# Patient Record
Sex: MALE | Race: WHITE | NOT HISPANIC OR LATINO | Employment: FULL TIME | URBAN - METROPOLITAN AREA
[De-identification: names, ages, dates, MRNs, and addresses within clinical notes are randomized per-mention and may not be internally consistent; named-entity substitution may affect disease eponyms.]

---

## 2017-03-29 ENCOUNTER — HOSPITAL ENCOUNTER (EMERGENCY)
Facility: HOSPITAL | Age: 67
Discharge: HOME | End: 2017-03-29
Attending: EMERGENCY MEDICINE
Payer: COMMERCIAL

## 2017-03-29 VITALS
HEART RATE: 82 BPM | BODY MASS INDEX: 28.15 KG/M2 | WEIGHT: 219.38 LBS | SYSTOLIC BLOOD PRESSURE: 134 MMHG | OXYGEN SATURATION: 95 % | RESPIRATION RATE: 23 BRPM | DIASTOLIC BLOOD PRESSURE: 78 MMHG | HEIGHT: 74 IN | TEMPERATURE: 98 F

## 2017-03-29 DIAGNOSIS — R53.83 FATIGUE, UNSPECIFIED TYPE: Primary | ICD-10-CM

## 2017-03-29 DIAGNOSIS — R53.83 LETHARGY: ICD-10-CM

## 2017-03-29 LAB
ALBUMIN SERPL BCP-MCNC: 3.9 G/DL
ALP SERPL-CCNC: 61 U/L
ALT SERPL W/O P-5'-P-CCNC: 19 U/L
ANION GAP SERPL CALC-SCNC: 12 MMOL/L
AST SERPL-CCNC: 18 U/L
BASOPHILS # BLD AUTO: 0.04 K/UL
BASOPHILS NFR BLD: 0.4 %
BILIRUB SERPL-MCNC: 0.5 MG/DL
BILIRUB UR QL STRIP: NEGATIVE
BUN SERPL-MCNC: 19 MG/DL
CALCIUM SERPL-MCNC: 9 MG/DL
CHLORIDE SERPL-SCNC: 107 MMOL/L
CLARITY UR REFRACT.AUTO: CLEAR
CO2 SERPL-SCNC: 20 MMOL/L
COLOR UR AUTO: YELLOW
CREAT SERPL-MCNC: 1.1 MG/DL
DIFFERENTIAL METHOD: NORMAL
EOSINOPHIL # BLD AUTO: 0.3 K/UL
EOSINOPHIL NFR BLD: 3.3 %
ERYTHROCYTE [DISTWIDTH] IN BLOOD BY AUTOMATED COUNT: 14.3 %
EST. GFR  (AFRICAN AMERICAN): >60 ML/MIN/1.73 M^2
EST. GFR  (NON AFRICAN AMERICAN): >60 ML/MIN/1.73 M^2
ETHANOL SERPL-MCNC: <10 MG/DL
GLUCOSE SERPL-MCNC: 113 MG/DL
GLUCOSE UR QL STRIP: NEGATIVE
HCT VFR BLD AUTO: 44.1 %
HGB BLD-MCNC: 14.7 G/DL
HGB UR QL STRIP: NEGATIVE
INR PPP: 1
KETONES UR QL STRIP: NEGATIVE
LEUKOCYTE ESTERASE UR QL STRIP: NEGATIVE
LYMPHOCYTES # BLD AUTO: 2.6 K/UL
LYMPHOCYTES NFR BLD: 27.1 %
MAGNESIUM SERPL-MCNC: 2 MG/DL
MCH RBC QN AUTO: 29.4 PG
MCHC RBC AUTO-ENTMCNC: 33.3 %
MCV RBC AUTO: 88 FL
MONOCYTES # BLD AUTO: 1 K/UL
MONOCYTES NFR BLD: 10.8 %
NEUTROPHILS # BLD AUTO: 5.6 K/UL
NEUTROPHILS NFR BLD: 58.2 %
NITRITE UR QL STRIP: NEGATIVE
PH UR STRIP: 6 [PH] (ref 5–8)
PHOSPHATE SERPL-MCNC: 3.5 MG/DL
PLATELET # BLD AUTO: 332 K/UL
PMV BLD AUTO: 9.4 FL
POTASSIUM SERPL-SCNC: 4.3 MMOL/L
PROT SERPL-MCNC: 7.4 G/DL
PROT UR QL STRIP: NEGATIVE
PROTHROMBIN TIME: 10.4 SEC
RBC # BLD AUTO: 5 M/UL
SODIUM SERPL-SCNC: 139 MMOL/L
SP GR UR STRIP: 1.02 (ref 1–1.03)
T4 FREE SERPL-MCNC: 1.18 NG/DL
TROPONIN I SERPL DL<=0.01 NG/ML-MCNC: <0.006 NG/ML
TSH SERPL DL<=0.005 MIU/L-ACNC: 0.18 UIU/ML
URN SPEC COLLECT METH UR: NORMAL
UROBILINOGEN UR STRIP-ACNC: NEGATIVE EU/DL
WBC # BLD AUTO: 9.6 K/UL

## 2017-03-29 PROCEDURE — 84439 ASSAY OF FREE THYROXINE: CPT

## 2017-03-29 PROCEDURE — 84484 ASSAY OF TROPONIN QUANT: CPT

## 2017-03-29 PROCEDURE — 83735 ASSAY OF MAGNESIUM: CPT

## 2017-03-29 PROCEDURE — 80320 DRUG SCREEN QUANTALCOHOLS: CPT

## 2017-03-29 PROCEDURE — 99284 EMERGENCY DEPT VISIT MOD MDM: CPT | Mod: 25

## 2017-03-29 PROCEDURE — 85025 COMPLETE CBC W/AUTO DIFF WBC: CPT

## 2017-03-29 PROCEDURE — 85610 PROTHROMBIN TIME: CPT

## 2017-03-29 PROCEDURE — 99900035 HC TECH TIME PER 15 MIN (STAT)

## 2017-03-29 PROCEDURE — 80053 COMPREHEN METABOLIC PANEL: CPT

## 2017-03-29 PROCEDURE — 81003 URINALYSIS AUTO W/O SCOPE: CPT

## 2017-03-29 PROCEDURE — 93010 ELECTROCARDIOGRAM REPORT: CPT | Mod: ,,, | Performed by: INTERNAL MEDICINE

## 2017-03-29 PROCEDURE — 93005 ELECTROCARDIOGRAM TRACING: CPT

## 2017-03-29 PROCEDURE — 84443 ASSAY THYROID STIM HORMONE: CPT

## 2017-03-29 PROCEDURE — 84100 ASSAY OF PHOSPHORUS: CPT

## 2017-03-29 RX ORDER — TAMSULOSIN HYDROCHLORIDE 0.4 MG/1
0.4 CAPSULE ORAL DAILY
COMMUNITY

## 2017-03-29 RX ORDER — LISINOPRIL 10 MG/1
10 TABLET ORAL DAILY
COMMUNITY

## 2017-03-29 RX ORDER — SIMVASTATIN 40 MG/1
40 TABLET, FILM COATED ORAL NIGHTLY
COMMUNITY

## 2017-03-29 RX ORDER — OMEPRAZOLE 40 MG/1
40 CAPSULE, DELAYED RELEASE ORAL DAILY
COMMUNITY

## 2017-03-29 RX ORDER — ALLOPURINOL 300 MG/1
300 TABLET ORAL DAILY
COMMUNITY

## 2017-03-29 RX ORDER — LEVOTHYROXINE SODIUM 100 UG/1
100 TABLET ORAL DAILY
COMMUNITY

## 2017-03-29 NOTE — ED AVS SNAPSHOT
[facility]  [address] [address] [address]               Rico Richards   3/29/2017  8:04 PM   ED    Description:  Male : 1950   Department:  [facility]           Your Care was Coordinated By:     Provider Role From To    Sal Boo MD Attending Provider 17 --      Reason for Visit     Fatigue           Diagnoses this Visit        Comments    Fatigue, unspecified type    -  Primary     Lethargy           ED Disposition     None           To Do List           Follow-up Information     Follow up with Sampson Velazquez MD In 3 days.    Specialty:  Internal Medicine    Contact information:    [address]  THE [facility] [address] [phone]          Follow up with [facility].    Specialty:  Emergency Medicine    Why:  If symptoms worsen, Or worsening condition or any other major concern    Contact information:    [address] [address] [address] [phone]      [facility]     [facility] Care [facility] -  Assistance  Registered nurses in the [facility] provide clinical advisement, health education, appointment booking, and other advisory services.  Call for this free service at [phone].             Medications                Verify that the below list of medications is an accurate representation of the medications you are currently taking.  If none reported, the list may be blank. If incorrect, please contact your healthcare provider. Carry this list with you in case of emergency.           Current Medications     allopurinol (ZYLOPRIM) 300 MG tablet Take 300 mg by mouth once daily.    levothyroxine (SYNTHROID) 100 MCG tablet Take 100 mcg by mouth once daily.    lisinopril 10 MG tablet Take 10 mg by mouth once daily.    omeprazole (PRILOSEC) 40 MG capsule Take 40 mg by mouth once daily.    simvastatin (ZOCOR) 40 MG tablet Take 40 mg by mouth  "every evening.    tamsulosin (FLOMAX) 0.4 mg Cp24 Take 0.4 mg by mouth once daily.           Clinical Reference Information           Your Vitals Were     BP Pulse Temp Resp Height Weight    118/73 82 97.9 °F (36.6 °C) (Oral) 18 6' 2" (1.88 m) 99.5 kg (219 lb 6.4 oz)    SpO2 BMI             96% 28.17 kg/m2         Allergies as of 3/29/2017        Reactions    Pcn [Penicillins]       Immunizations Administered on Date of Encounter - 3/29/2017     None      ED Micro, Lab, POCT     Start Ordered       Status Ordering Provider    03/29/17 2039 03/29/17 2038  Phosphorus  STAT      Final result     03/29/17 2039 03/29/17 2038  TSH  STAT      Final result     03/29/17 2039 03/29/17 2038  T4, free  STAT      Final result     03/29/17 2038 03/29/17 2038  CBC auto differential  STAT      Final result     03/29/17 2038 03/29/17 2038  Comprehensive metabolic panel  STAT      Final result     03/29/17 2038 03/29/17 2038  Urinalysis - Clean Catch  STAT      Final result     03/29/17 2038 03/29/17 2038  Ethanol  Once      Final result     03/29/17 2038 03/29/17 2038  Protime-INR  STAT      Final result     03/29/17 2038 03/29/17 2038  Troponin I  STAT      Final result     03/29/17 2038 03/29/17 2038  Magnesium  Once      Final result       ED Imaging Orders     Start Ordered       Status Ordering Provider    03/29/17 2038 03/29/17 2038  X-Ray Chest PA And Lateral  1 time imaging      Final result       Discharge References/Attachments     FATIGUE, MANAGING (ENGLISH)    WEAKNESS (UNCERTAIN CAUSE) (ENGLISH)      [facility] Sign-Up     Activating your [facility] account is as easy as 1-2-3!     1) Visit [email or web address], select Sign Up Now, enter this activation code and your date of birth, then select Next.  [email or web address]  Expires: 5/13/2017 10:29 PM      2) Create a username and password to use when you visit [facility] in the future and select a security question in case you lose your password and select Next.    3) Enter your " e-mail address and click Sign Up!    Additional Information  If you have questions, please e-mail [email or web address] or call [phone] to talk to our [facility] staff. Remember, [facility] is NOT to be used for urgent needs. For medical emergencies, dial 911.         Smoking Cessation     If you would like to quit smoking:   You may be eligible for free services if you are a [place] resident and started smoking cigarettes before September 1, 1988.  Call the Smoking Cessation Trust ([facility]) toll free at [phone] or [phone].   Call 4-268-QUIT-NOW if you do not meet the above criteria.             [facility] complies with applicable Federal civil rights laws and does not discriminate on the basis of race, color, national origin, age, disability, or sex.        Language Assistance Services     ATTENTION: Language assistance services are available, free of charge. Please call [phone].      ATENCIÓN: Si habla español, tiene a vergara disposición servicios gratuitos de asistencia lingüística. Llame al [phone].     CHÚ Ý: N?u b?n nói Ti?ng Vi?t, có các d?ch v? h? tr? ngôn ng? mi?n phí dành cho b?n. G?i s? [phone].

## 2017-03-30 NOTE — ED NOTES
"Patient verbally verified and Spelled Full Name and Date of Birth.  Pt c/o sudden onset weakness,"im feeling lethargic"  since about 6pm tonight .  Pt denies pain anywhere, just weakness.  LOC: The patient is awake, alert and aware of environment with an appropriate affect, the patient is oriented x 3 and speaking appropriately.  APPEARANCE: Patient resting comfortably and in no acute distress, patient is clean and well groomed, patient's clothing is properly fastened.  HEENT: Brief WNL, face symmetrical, no drooping, speech clear  SKIN: Brief WNL, flushed.   MUSCULOSKELETAL: Brief WNL, MAEW, pt denies focal weakness, equal strengths x 4 ext  RESPIRATORY: Brief WNL, resp easy, chest sounds clear laura  CARDIAC: Brief WNL, denies chest pain  GASTRO: Brief WNL, abd soft, nontender,  Appetite as usual, denies N or V  : Brief WNL, denies dysuria  Peripheral Vasc: Brief WNL  NEURO: Brief WNL, alert  PSYCH: Brief WNL  "

## 2017-03-30 NOTE — ED NOTES
Pt states that in past few weeks if he is standing up for any length of time (over 5 min) he begins to feel as if he will  Fall.  He denies getting light headed or dizzy, denies pain anywhere, just feels like he's going fall down, yet denies weakness in legs.  ERMD made aware

## 2017-03-30 NOTE — ED PROVIDER NOTES
"SCRIBE #1 NOTE: I, Sal Boo, am scribing for, and in the presence of, No att. providers found. I have scribed the entire note.      History      Chief Complaint   Patient presents with    Fatigue     Pt states "I'm just not feeling right. Maybe my BP, maybe my thyroid. I'm not really sure but I know it's something. I'm feeling real lethargic." Denies CP, denies SOB, denies N/V/D. Onset 2h PTA.        Review of patient's allergies indicates:   Allergen Reactions    Pcn [penicillins]         HPI   HPI    3/29/2017, 9:25 PM   History obtained from the patient      History of Present Illness: Rico Richards is a 66 y.o. male patient who presents to the Emergency Department for evaluation for lethargy, fatigue, and weakness.  The patient noted that he had gotten up for work late this afternoon and went to work around 6:00 this evening.  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  The patient denied any focal neurologic deficits or slurred speech or weakness on one side more than the other.  The patient also denies any visual changes, shortness of breath, chest pain, abdominal pain, nausea, vomiting, fever, chills, or diarrhea.  The patient notes that he has had some issues with his thyroid in the past and that he takes Synthroid.  The patient denies taking any sedatives, alcohol, or drugs.  The patient noted that he did take some melatonin approximately 2 days ago.  There are no other major concerns or complaints noted at this time.      Arrival mode: Personal vehicle    PCP: Sampson Velazquez MD       Past Medical History:  Past Medical History:   Diagnosis Date    BPH (benign prostatic hyperplasia)     GERD (gastroesophageal reflux disease)     Hypertension     Kidney stones     Thyroid disease        Past Surgical History:  Past Surgical History:   Procedure Laterality Date    BACK SURGERY      THYROIDECTOMY, PARTIAL           Family " History:  History reviewed. No pertinent family history.    Social History:  Social History     Social History Main Topics    Smoking status: Current Every Day Smoker     Packs/day: 1.00     Types: Cigarettes    Smokeless tobacco: Never Used    Alcohol use Yes      Comment: rarely    Drug use: No    Sexual activity: Not on file       ROS   Review of Systems   Constitutional: Positive for fatigue. Negative for fever.   HENT: Negative for sore throat.    Respiratory: Negative for shortness of breath.    Cardiovascular: Negative for chest pain.   Gastrointestinal: Negative for nausea.   Genitourinary: Negative for dysuria.   Musculoskeletal: Negative for back pain.   Skin: Negative for rash.   Neurological: Negative for weakness.   Hematological: Does not bruise/bleed easily.   All other systems reviewed and are negative.      Physical Exam    Initial Vitals   BP Pulse Resp Temp SpO2   03/29/17 2016 03/29/17 2016 03/29/17 2016 03/29/17 2016 03/29/17 2016   121/79 105 20 97.9 °F (36.6 °C) 95 %      Physical Exam  Nursing Notes and Vital Signs Reviewed.  Constitutional: Patient is in no acute distress. Awake and alert. Well-developed and well-nourished.  Head: Atraumatic. Normocephalic.  Eyes: PERRL. EOM intact. Conjunctivae are not pale. No scleral icterus.  ENT: Mucous membranes are moist. Oropharynx is clear and symmetric.    Neck: Supple. Full ROM. No lymphadenopathy.  Cardiovascular: Regular rate. Regular rhythm. No murmurs, rubs, or gallops. Distal pulses are 2+ and symmetric.  Pulmonary/Chest: No respiratory distress. Clear to auscultation bilaterally. No wheezing, rales, or rhonchi.  Abdominal: Soft and non-distended.  There is no tenderness.  No rebound, guarding, or rigidity. Good bowel sounds.  Genitourinary: No CVA tenderness  Musculoskeletal: Moves all extremities. No obvious deformities. No edema. No calf tenderness.  Skin: Warm and dry.  Neurological:  Alert, awake, and appropriate.  Normal speech.   "No acute focal neurological deficits are appreciated.  Psychiatric: Normal affect. Good eye contact. Appropriate in content.    ED Course    Procedures  ED Vital Signs:  Vitals:    03/29/17 2016 03/29/17 2058 03/29/17 2100 03/29/17 2102   BP: 121/79 123/72 129/77 117/72   Pulse: 105 87 99 100   Resp: 20 16 (!) 25 20   Temp: 97.9 °F (36.6 °C)      TempSrc: Oral      SpO2: 95% (!) 94% 96% 96%   Weight: 99.5 kg (219 lb 6.4 oz)      Height: 6' 2" (1.88 m)       03/29/17 2131 03/29/17 2201 03/29/17 2231   BP: 121/75 118/73 134/78   Pulse: 86 82 82   Resp: 17 18 (!) 23   Temp:      TempSrc:      SpO2: (!) 94% 96% 95%   Weight:      Height:          Abnormal Lab Results:  Labs Reviewed   COMPREHENSIVE METABOLIC PANEL - Abnormal; Notable for the following:        Result Value    CO2 20 (*)     Glucose 113 (*)     All other components within normal limits   TSH - Abnormal; Notable for the following:     TSH 0.179 (*)     All other components within normal limits   CBC W/ AUTO DIFFERENTIAL   URINALYSIS   ALCOHOL,MEDICAL (ETHANOL)   PROTIME-INR   TROPONIN I   MAGNESIUM   PHOSPHORUS   T4, FREE        All Lab Results:  Results for orders placed or performed during the hospital encounter of 03/29/17   CBC auto differential   Result Value Ref Range    WBC 9.60 3.90 - 12.70 K/uL    RBC 5.00 4.60 - 6.20 M/uL    Hemoglobin 14.7 14.0 - 18.0 g/dL    Hematocrit 44.1 40.0 - 54.0 %    MCV 88 82 - 98 fL    MCH 29.4 27.0 - 31.0 pg    MCHC 33.3 32.0 - 36.0 %    RDW 14.3 11.5 - 14.5 %    Platelets 332 150 - 350 K/uL    MPV 9.4 9.2 - 12.9 fL    Gran # 5.6 1.8 - 7.7 K/uL    Lymph # 2.6 1.0 - 4.8 K/uL    Mono # 1.0 0.3 - 1.0 K/uL    Eos # 0.3 0.0 - 0.5 K/uL    Baso # 0.04 0.00 - 0.20 K/uL    Gran% 58.2 38.0 - 73.0 %    Lymph% 27.1 18.0 - 48.0 %    Mono% 10.8 4.0 - 15.0 %    Eosinophil% 3.3 0.0 - 8.0 %    Basophil% 0.4 0.0 - 1.9 %    Differential Method Automated    Comprehensive metabolic panel   Result Value Ref Range    Sodium 139 136 - 145 " mmol/L    Potassium 4.3 3.5 - 5.1 mmol/L    Chloride 107 95 - 110 mmol/L    CO2 20 (L) 23 - 29 mmol/L    Glucose 113 (H) 70 - 110 mg/dL    BUN, Bld 19 8 - 23 mg/dL    Creatinine 1.1 0.5 - 1.4 mg/dL    Calcium 9.0 8.7 - 10.5 mg/dL    Total Protein 7.4 6.0 - 8.4 g/dL    Albumin 3.9 3.5 - 5.2 g/dL    Total Bilirubin 0.5 0.1 - 1.0 mg/dL    Alkaline Phosphatase 61 55 - 135 U/L    AST 18 10 - 40 U/L    ALT 19 10 - 44 U/L    Anion Gap 12 8 - 16 mmol/L    eGFR if African American >60.0 >60 mL/min/1.73 m^2    eGFR if non African American >60.0 >60 mL/min/1.73 m^2   Urinalysis - Clean Catch   Result Value Ref Range    Specimen UA Urine, Clean Catch     Color, UA Yellow Yellow, Straw, Elin    Appearance, UA Clear Clear    pH, UA 6.0 5.0 - 8.0    Specific Gravity, UA 1.020 1.005 - 1.030    Protein, UA Negative Negative    Glucose, UA Negative Negative    Ketones, UA Negative Negative    Bilirubin (UA) Negative Negative    Occult Blood UA Negative Negative    Nitrite, UA Negative Negative    Urobilinogen, UA Negative <2.0 EU/dL    Leukocytes, UA Negative Negative   Ethanol   Result Value Ref Range    Alcohol, Medical, Serum <10 <10 mg/dL   Protime-INR   Result Value Ref Range    Prothrombin Time 10.4 9.0 - 12.5 sec    INR 1.0 0.8 - 1.2   Troponin I   Result Value Ref Range    Troponin I <0.006 0.000 - 0.026 ng/mL   Magnesium   Result Value Ref Range    Magnesium 2.0 1.6 - 2.6 mg/dL   Phosphorus   Result Value Ref Range    Phosphorus 3.5 2.7 - 4.5 mg/dL   TSH   Result Value Ref Range    TSH 0.179 (L) 0.400 - 4.000 uIU/mL   T4, free   Result Value Ref Range    Free T4 1.18 0.71 - 1.51 ng/dL         Imaging Results:  Imaging Results         X-Ray Chest PA And Lateral (Final result) Result time:  03/29/17 21:40:06    Final result by Timothy Agustin III, MD (03/29/17 21:40:06)    Impression:     Negative two-view chest x-ray.      Electronically signed by: TIMOTHY AGUSTIN MD  Date:     03/29/17  Time:    21:40     Narrative:     Chest x-ray, 2 views.    Indication chest pain.    Normal heart size appeared clear lungs.                EKG Readings: (Independently Interpreted)   Initial Reading: No STEMI. Rhythm: Normal Sinus Rhythm. Heart Rate: 93. Ectopy: No Ectopy. Conduction: Normal. Axis: Normal.        The Emergency Provider reviewed the vital signs and test results, which are outlined above.    ED Discussion     9:34 PM - Re-evaluation:  The patient is resting comfortably and is in no acute distress. He states that his symptoms have improved after treatment within ER. Discussed test results and notified of pending labs. Answered questions at this time.     10:22 PM - Re-evaluation: The patient is resting comfortably and is in no acute distress. He states that his symptoms have improved after treatment within ER. Discussed test results, shared treatment plan, specific conditions for return, and importance of follow up with patient and family.  He understands and agrees with the plan as discussed. Answered  his questions at this time. He has remained hemodynamically stable throughout the ED course and is appropriate for discharge home.         ED Medication(s):  Medications - No data to display    Discharge Medication List as of 3/29/2017 10:29 PM          Follow-up Information     Follow up with Sampson Velazquez MD In 3 days.    Specialty:  Internal Medicine    Contact information:    [address]  THE [facility] [address] [phone]          Follow up with [facility].    Specialty:  Emergency Medicine    Why:  If symptoms worsen, Or worsening condition or any other major concern    Contact information:    [address] [address] [address] [phone]            Medical Decision Making    Medical Decision Making:   Clinical Tests:   Lab Tests: Ordered and Reviewed  The following lab test(s) were unremarkable: CBC, CMP, PT, Troponin and Urinalysis       <> Summary of Lab:  Magnesium, phosphorus, TSH, free T4 levels were all drawn as well  Radiological Study: Ordered and Reviewed  Medical Tests: Ordered and Reviewed           Scribe Attestation:   Scribe #1: I performed the above scribed service and the documentation accurately describes the services I performed. I attest to the accuracy of the note.    Attending:   Physician Attestation Statement for Scribe #1: I, No att. providers found, personally performed the services described in this documentation, as scribed by Sal Boo, in my presence, and it is both accurate and complete.          Clinical Impression       ICD-10-CM ICD-9-CM   1. Fatigue, unspecified type R53.83 780.79   2. Lethargy R53.83 780.79       Disposition:   Disposition: Discharged  Condition: Stable         Sal Boo MD  03/30/17 0452

## 2018-05-01 ENCOUNTER — OFFICE VISIT (OUTPATIENT)
Dept: INTERNAL MEDICINE | Facility: CLINIC | Age: 68
End: 2018-05-01
Payer: COMMERCIAL

## 2018-05-01 VITALS
HEIGHT: 73 IN | RESPIRATION RATE: 16 BRPM | OXYGEN SATURATION: 97 % | HEART RATE: 99 BPM | WEIGHT: 222 LBS | DIASTOLIC BLOOD PRESSURE: 90 MMHG | BODY MASS INDEX: 29.42 KG/M2 | TEMPERATURE: 97 F | SYSTOLIC BLOOD PRESSURE: 120 MMHG

## 2018-05-01 DIAGNOSIS — L23.7 ALLERGIC DERMATITIS DUE TO POISON IVY: Primary | ICD-10-CM

## 2018-05-01 PROCEDURE — 99213 OFFICE O/P EST LOW 20 MIN: CPT | Mod: 25,,, | Performed by: NURSE PRACTITIONER

## 2018-05-01 PROCEDURE — 96372 THER/PROPH/DIAG INJ SC/IM: CPT | Mod: S$GLB,,, | Performed by: FAMILY MEDICINE

## 2018-05-01 PROCEDURE — 99999 PR PBB SHADOW E&M-EST. PATIENT-LVL IV: CPT | Mod: PBBFAC,,, | Performed by: NURSE PRACTITIONER

## 2018-05-01 RX ORDER — METHYLPREDNISOLONE ACETATE 40 MG/ML
40 INJECTION, SUSPENSION INTRA-ARTICULAR; INTRALESIONAL; INTRAMUSCULAR; SOFT TISSUE
Status: COMPLETED | OUTPATIENT
Start: 2018-05-01 | End: 2018-05-01

## 2018-05-01 RX ORDER — VARDENAFIL HYDROCHLORIDE 20 MG/1
20 TABLET ORAL
COMMUNITY
Start: 2017-08-21 | End: 2018-08-21

## 2018-05-01 RX ADMIN — METHYLPREDNISOLONE ACETATE 40 MG: 40 INJECTION, SUSPENSION INTRA-ARTICULAR; INTRALESIONAL; INTRAMUSCULAR; SOFT TISSUE at 07:05

## 2018-05-01 NOTE — PATIENT INSTRUCTIONS
Managing a Poison Ivy, Poison Oak, or Poison Sumac Reaction  If you come in contact with urushiol    If you think you may have come in contact with the sap oil (called urushiol) contained in poison ivy, poison oak, or poison sumac plants, wash the affected part of your skin. Do this within 15 minutes after contact. Use water or preferably, soap and water.  Undress, and wash your clothes and gear as soon as you can. Be sure to wash any pet that was with you. Taking these steps can help prevent spreading sap oil to someone else. If you have a rash, but are not sure if it is from one of these plants, see your healthcare provider.  To soothe the itching  Your skin may react to poison oak, poison ivy, and poison sumac within hours to a few days after contact. Once you have come into contact with these plants, you cant stop the reaction. But you can take these steps to soothe the itching:  · Dont scratch or scrub your rash. Not even if the itching is severe. Scratching can lead to infection.  · Bathe in lukewarm (not hot) water. Or take short cool showers to relieve the itching. For a more soothing bath, add oatmeal to the water.  · Use antihistamines that are taken by mouth. These include diphenhydramine. You can buy these at the pharmacy. Talk to your healthcare provider or pharmacist for more information on oral antihistamines.  · Use over-the-counter treatments on your skin. These include cortisone and calamine lotion.  How your skin may react  A mild rash may become red, swollen, and itchy. The rash may form a line on your skin where you brushed against the plant. If you have a severe rash, your itching may worsen. And your rash may blister and ooze. If this happens, seek medical care. The fluid from your blisters will not make your rash spread. With or without medical care, your rash may last up to 3 weeks. In the future, try to avoid coming in contact with these plants.  When to call your healthcare  provider  Call your healthcare provider if:  · Your rash is severe  · The rash spreads beyond the exposed part of your body or affects your face.  · The rash does not clear up within a few weeks  You may be given medicine to take by mouth or apply directly on the skin.  Call 911  Call 911 if you have any of the following:  · Trouble breathing or swallowing  · Any significant swelling  Date Last Reviewed: 3/1/2017  © 6304-2396 [employer]. [address] [place] [address], [place] [address]. All rights reserved. This information is not intended as a substitute for professional medical care. Always follow your healthcare professional's instructions.

## 2018-05-01 NOTE — PROGRESS NOTES
Subjective:       Patient ID: Rico Richards is a 67 y.o. male.    Chief Complaint: Poison Ivy (arms) and Itching (arms)    Poison Ivy   This is a new problem. The current episode started in the past 7 days. The problem has been gradually worsening since onset. The affected locations include the right wrist and left wrist. The rash is characterized by blistering, redness and itchiness. He was exposed to plant contact. Pertinent negatives include no congestion, cough, diarrhea, fatigue, fever, joint pain, rhinorrhea, shortness of breath or sore throat. Past treatments include anti-itch cream, topical steroids and antihistamine.     Review of Systems   Constitutional: Negative for appetite change, chills, diaphoresis, fatigue and fever.   HENT: Negative for congestion, rhinorrhea and sore throat.    Eyes: Negative.    Respiratory: Negative for cough, chest tightness and shortness of breath.    Cardiovascular: Negative.    Gastrointestinal: Negative for abdominal pain, diarrhea and nausea.   Musculoskeletal: Negative.  Negative for joint pain.   Skin: Positive for color change, itching and rash. Negative for wound.   Allergic/Immunologic: Positive for environmental allergies.   Neurological: Negative.        Objective:      Physical Exam   Constitutional: He is oriented to person, place, and time. He appears well-developed. No distress.   Eyes: Pupils are equal, round, and reactive to light.   Cardiovascular: Normal rate and regular rhythm.    Pulmonary/Chest: Effort normal and breath sounds normal. No respiratory distress. He has no wheezes.   Neurological: He is alert and oriented to person, place, and time.   Skin: Skin is warm and dry. Rash noted. Rash is maculopapular. He is not diaphoretic.        Psychiatric: He has a normal mood and affect. His behavior is normal.   Nursing note and vitals reviewed.      Assessment:       1. Allergic dermatitis due to poison ivy        Plan:     Problem List Items Addressed This  Visit        Other    Allergic dermatitis due to poison ivy - Primary    Current Assessment & Plan     Can also use cold compress or oatmeal bath etc for supportive care  Avoid scratching  RTC if not improving over next 3 days         Relevant Medications    methylPREDNISolone acetate injection 40 mg (Completed)

## 2018-05-02 PROBLEM — L23.7 ALLERGIC DERMATITIS DUE TO POISON IVY: Status: ACTIVE | Noted: 2018-05-02

## 2018-05-02 NOTE — ASSESSMENT & PLAN NOTE
Can also use cold compress or oatmeal bath etc for supportive care  Avoid scratching  RTC if not improving over next 3 days

## 2021-02-18 ENCOUNTER — HOSPITAL ENCOUNTER (EMERGENCY)
Facility: HOSPITAL | Age: 71
Discharge: HOME | End: 2021-02-18
Attending: FAMILY MEDICINE
Payer: MEDICARE

## 2021-02-18 VITALS
WEIGHT: 238.13 LBS | BODY MASS INDEX: 30.56 KG/M2 | TEMPERATURE: 98 F | SYSTOLIC BLOOD PRESSURE: 153 MMHG | HEART RATE: 107 BPM | RESPIRATION RATE: 16 BRPM | DIASTOLIC BLOOD PRESSURE: 88 MMHG | OXYGEN SATURATION: 96 % | HEIGHT: 74 IN

## 2021-02-18 DIAGNOSIS — T83.9XXA PROBLEM WITH FOLEY CATHETER, INITIAL ENCOUNTER: Primary | ICD-10-CM

## 2021-02-18 DIAGNOSIS — N32.89 BLADDER SPASM: ICD-10-CM

## 2021-02-18 PROCEDURE — 25000003 PHARM REV CODE 250: Mod: ER | Performed by: FAMILY MEDICINE

## 2021-02-18 PROCEDURE — 99283 EMERGENCY DEPT VISIT LOW MDM: CPT | Mod: ER

## 2021-02-18 RX ORDER — OXYBUTYNIN CHLORIDE 5 MG/1
5 TABLET ORAL 2 TIMES DAILY
Qty: 6 TABLET | Refills: 0 | Status: SHIPPED | OUTPATIENT
Start: 2021-02-18 | End: 2021-02-21

## 2021-02-18 RX ORDER — LIDOCAINE HYDROCHLORIDE 20 MG/ML
JELLY TOPICAL
Status: DISCONTINUED | OUTPATIENT
Start: 2021-02-18 | End: 2021-02-18

## 2021-02-18 RX ORDER — LIDOCAINE HYDROCHLORIDE 20 MG/ML
15 SOLUTION OROPHARYNGEAL
Status: DISCONTINUED | OUTPATIENT
Start: 2021-02-18 | End: 2021-02-18

## 2021-02-18 RX ORDER — LIDOCAINE HYDROCHLORIDE 20 MG/ML
10 JELLY TOPICAL
Status: COMPLETED | OUTPATIENT
Start: 2021-02-18 | End: 2021-02-18

## 2021-02-18 RX ADMIN — LIDOCAINE HYDROCHLORIDE 10 ML: 20 JELLY TOPICAL at 09:02

## 2022-10-28 ENCOUNTER — HOSPITAL ENCOUNTER (EMERGENCY)
Facility: HOSPITAL | Age: 72
Discharge: HOME | End: 2022-10-28
Attending: EMERGENCY MEDICINE
Payer: MEDICARE

## 2022-10-28 VITALS
WEIGHT: 198 LBS | TEMPERATURE: 98 F | SYSTOLIC BLOOD PRESSURE: 153 MMHG | HEIGHT: 74 IN | HEART RATE: 105 BPM | BODY MASS INDEX: 25.41 KG/M2 | RESPIRATION RATE: 14 BRPM | OXYGEN SATURATION: 100 % | DIASTOLIC BLOOD PRESSURE: 78 MMHG

## 2022-10-28 DIAGNOSIS — R53.83 FATIGUE: ICD-10-CM

## 2022-10-28 DIAGNOSIS — R53.81 DEBILITY: ICD-10-CM

## 2022-10-28 DIAGNOSIS — E86.0 DEHYDRATION: Primary | ICD-10-CM

## 2022-10-28 DIAGNOSIS — Z79.899 POLYPHARMACY: ICD-10-CM

## 2022-10-28 DIAGNOSIS — R06.02 SOB (SHORTNESS OF BREATH): ICD-10-CM

## 2022-10-28 LAB
ALBUMIN SERPL BCP-MCNC: 3.6 G/DL (ref 3.5–5.2)
ALP SERPL-CCNC: 154 U/L (ref 55–135)
ALT SERPL W/O P-5'-P-CCNC: 55 U/L (ref 10–44)
AMPHET+METHAMPHET UR QL: NEGATIVE
ANION GAP SERPL CALC-SCNC: 13 MMOL/L (ref 8–16)
AST SERPL-CCNC: 36 U/L (ref 10–40)
BACTERIA #/AREA URNS AUTO: NORMAL /HPF
BARBITURATES UR QL SCN>200 NG/ML: NEGATIVE
BASOPHILS # BLD AUTO: 0.08 K/UL (ref 0–0.2)
BASOPHILS NFR BLD: 1 % (ref 0–1.9)
BENZODIAZ UR QL SCN>200 NG/ML: NEGATIVE
BILIRUB SERPL-MCNC: 0.6 MG/DL (ref 0.1–1)
BILIRUB UR QL STRIP: NEGATIVE
BNP SERPL-MCNC: <10 PG/ML (ref 0–99)
BUN SERPL-MCNC: 15 MG/DL (ref 8–23)
BZE UR QL SCN: NEGATIVE
CALCIUM SERPL-MCNC: 9.5 MG/DL (ref 8.7–10.5)
CANNABINOIDS UR QL SCN: ABNORMAL
CAOX CRY UR QL COMP ASSIST: NORMAL
CHLORIDE SERPL-SCNC: 103 MMOL/L (ref 95–110)
CLARITY UR REFRACT.AUTO: CLEAR
CO2 SERPL-SCNC: 21 MMOL/L (ref 23–29)
COLOR UR AUTO: ABNORMAL
CREAT SERPL-MCNC: 0.9 MG/DL (ref 0.5–1.4)
CREAT UR-MCNC: 214.5 MG/DL (ref 23–375)
D DIMER PPP IA.FEU-MCNC: 1.91 MG/L FEU
DIFFERENTIAL METHOD: ABNORMAL
EOSINOPHIL # BLD AUTO: 0.1 K/UL (ref 0–0.5)
EOSINOPHIL NFR BLD: 1.6 % (ref 0–8)
ERYTHROCYTE [DISTWIDTH] IN BLOOD BY AUTOMATED COUNT: 13.3 % (ref 11.5–14.5)
EST. GFR  (NO RACE VARIABLE): >60 ML/MIN/1.73 M^2
GLUCOSE SERPL-MCNC: 106 MG/DL (ref 70–110)
GLUCOSE UR QL STRIP: NEGATIVE
HCT VFR BLD AUTO: 42.3 % (ref 40–54)
HGB BLD-MCNC: 14.3 G/DL (ref 14–18)
HGB UR QL STRIP: NEGATIVE
HYALINE CASTS UR QL AUTO: 1 /LPF
IMM GRANULOCYTES # BLD AUTO: 0.03 K/UL (ref 0–0.04)
IMM GRANULOCYTES NFR BLD AUTO: 0.4 % (ref 0–0.5)
KETONES UR QL STRIP: NEGATIVE
LACTATE SERPL-SCNC: 1.8 MMOL/L (ref 0.5–2.2)
LEUKOCYTE ESTERASE UR QL STRIP: ABNORMAL
LYMPHOCYTES # BLD AUTO: 1.6 K/UL (ref 1–4.8)
LYMPHOCYTES NFR BLD: 20.4 % (ref 18–48)
MAGNESIUM SERPL-MCNC: 1.7 MG/DL (ref 1.6–2.6)
MCH RBC QN AUTO: 29.5 PG (ref 27–31)
MCHC RBC AUTO-ENTMCNC: 33.8 G/DL (ref 32–36)
MCV RBC AUTO: 87 FL (ref 82–98)
METHADONE UR QL SCN>300 NG/ML: NEGATIVE
MICROSCOPIC COMMENT: NORMAL
MONOCYTES # BLD AUTO: 0.8 K/UL (ref 0.3–1)
MONOCYTES NFR BLD: 9.3 % (ref 4–15)
NEUTROPHILS # BLD AUTO: 5.4 K/UL (ref 1.8–7.7)
NEUTROPHILS NFR BLD: 67.3 % (ref 38–73)
NITRITE UR QL STRIP: NEGATIVE
NRBC BLD-RTO: 0 /100 WBC
OPIATES UR QL SCN: ABNORMAL
PCP UR QL SCN>25 NG/ML: NEGATIVE
PH UR STRIP: 7 [PH] (ref 5–8)
PLATELET # BLD AUTO: 504 K/UL (ref 150–450)
PMV BLD AUTO: 9.6 FL (ref 9.2–12.9)
POTASSIUM SERPL-SCNC: 4 MMOL/L (ref 3.5–5.1)
PROT SERPL-MCNC: 7.7 G/DL (ref 6–8.4)
PROT UR QL STRIP: ABNORMAL
RBC # BLD AUTO: 4.84 M/UL (ref 4.6–6.2)
RBC #/AREA URNS AUTO: 0 /HPF (ref 0–4)
SODIUM SERPL-SCNC: 137 MMOL/L (ref 136–145)
SP GR UR STRIP: 1.01 (ref 1–1.03)
T4 FREE SERPL-MCNC: 1.32 NG/DL (ref 0.71–1.51)
TOXICOLOGY INFORMATION: ABNORMAL
TROPONIN I SERPL DL<=0.01 NG/ML-MCNC: 0.01 NG/ML (ref 0–0.03)
TSH SERPL DL<=0.005 MIU/L-ACNC: 0.27 UIU/ML (ref 0.4–4)
URN SPEC COLLECT METH UR: ABNORMAL
UROBILINOGEN UR STRIP-ACNC: NEGATIVE EU/DL
WBC # BLD AUTO: 8.03 K/UL (ref 3.9–12.7)
WBC #/AREA URNS AUTO: 1 /HPF (ref 0–5)

## 2022-10-28 PROCEDURE — 99285 EMERGENCY DEPT VISIT HI MDM: CPT | Mod: 25,ER

## 2022-10-28 PROCEDURE — 93010 EKG 12-LEAD: ICD-10-PCS | Mod: ,,, | Performed by: INTERNAL MEDICINE

## 2022-10-28 PROCEDURE — 87389 HIV-1 AG W/HIV-1&-2 AB AG IA: CPT | Performed by: EMERGENCY MEDICINE

## 2022-10-28 PROCEDURE — 81000 URINALYSIS NONAUTO W/SCOPE: CPT | Mod: 59,ER | Performed by: EMERGENCY MEDICINE

## 2022-10-28 PROCEDURE — 96360 HYDRATION IV INFUSION INIT: CPT | Mod: ER

## 2022-10-28 PROCEDURE — 83880 ASSAY OF NATRIURETIC PEPTIDE: CPT | Mod: ER | Performed by: EMERGENCY MEDICINE

## 2022-10-28 PROCEDURE — 84443 ASSAY THYROID STIM HORMONE: CPT | Mod: ER | Performed by: EMERGENCY MEDICINE

## 2022-10-28 PROCEDURE — 87040 BLOOD CULTURE FOR BACTERIA: CPT | Performed by: EMERGENCY MEDICINE

## 2022-10-28 PROCEDURE — 83735 ASSAY OF MAGNESIUM: CPT | Mod: ER | Performed by: EMERGENCY MEDICINE

## 2022-10-28 PROCEDURE — 80053 COMPREHEN METABOLIC PANEL: CPT | Mod: ER | Performed by: EMERGENCY MEDICINE

## 2022-10-28 PROCEDURE — 85025 COMPLETE CBC W/AUTO DIFF WBC: CPT | Mod: ER | Performed by: EMERGENCY MEDICINE

## 2022-10-28 PROCEDURE — 86803 HEPATITIS C AB TEST: CPT | Performed by: EMERGENCY MEDICINE

## 2022-10-28 PROCEDURE — 85379 FIBRIN DEGRADATION QUANT: CPT | Mod: ER | Performed by: EMERGENCY MEDICINE

## 2022-10-28 PROCEDURE — 93005 ELECTROCARDIOGRAM TRACING: CPT | Mod: ER

## 2022-10-28 PROCEDURE — 83605 ASSAY OF LACTIC ACID: CPT | Mod: ER | Performed by: EMERGENCY MEDICINE

## 2022-10-28 PROCEDURE — 84484 ASSAY OF TROPONIN QUANT: CPT | Mod: ER | Performed by: EMERGENCY MEDICINE

## 2022-10-28 PROCEDURE — 25500020 PHARM REV CODE 255: Mod: ER | Performed by: EMERGENCY MEDICINE

## 2022-10-28 PROCEDURE — 80307 DRUG TEST PRSMV CHEM ANLYZR: CPT | Mod: ER | Performed by: EMERGENCY MEDICINE

## 2022-10-28 PROCEDURE — 25000003 PHARM REV CODE 250: Mod: ER | Performed by: EMERGENCY MEDICINE

## 2022-10-28 PROCEDURE — 84439 ASSAY OF FREE THYROXINE: CPT | Mod: ER | Performed by: EMERGENCY MEDICINE

## 2022-10-28 PROCEDURE — 93010 ELECTROCARDIOGRAM REPORT: CPT | Mod: ,,, | Performed by: INTERNAL MEDICINE

## 2022-10-28 RX ORDER — ROSUVASTATIN CALCIUM 40 MG/1
40 TABLET, COATED ORAL
COMMUNITY
Start: 2022-07-12 | End: 2023-01-08

## 2022-10-28 RX ORDER — LIDOCAINE 50 MG/G
1 PATCH TOPICAL
Status: DISCONTINUED | OUTPATIENT
Start: 2022-10-28 | End: 2022-10-28 | Stop reason: HOSPADM

## 2022-10-28 RX ORDER — NALOXEGOL OXALATE 25 MG/1
25 TABLET, FILM COATED ORAL DAILY
COMMUNITY
Start: 2022-10-27

## 2022-10-28 RX ORDER — HYDROCODONE BITARTRATE AND ACETAMINOPHEN 5; 325 MG/1; MG/1
1 TABLET ORAL EVERY 6 HOURS PRN
COMMUNITY
Start: 2022-09-26

## 2022-10-28 RX ORDER — METFORMIN HYDROCHLORIDE 500 MG/1
500 TABLET, EXTENDED RELEASE ORAL DAILY
COMMUNITY
Start: 2022-10-25

## 2022-10-28 RX ORDER — METOPROLOL TARTRATE 25 MG/1
25 TABLET, FILM COATED ORAL 2 TIMES DAILY
COMMUNITY
Start: 2022-10-26

## 2022-10-28 RX ORDER — GABAPENTIN 300 MG/1
300 CAPSULE ORAL 3 TIMES DAILY
COMMUNITY
Start: 2022-10-25

## 2022-10-28 RX ADMIN — LIDOCAINE 1 PATCH: 50 PATCH TOPICAL at 05:10

## 2022-10-28 RX ADMIN — IOHEXOL 100 ML: 350 INJECTION, SOLUTION INTRAVENOUS at 03:10

## 2022-10-28 RX ADMIN — SODIUM CHLORIDE 1000 ML: 0.9 INJECTION, SOLUTION INTRAVENOUS at 02:10

## 2022-10-28 RX ADMIN — SODIUM CHLORIDE 500 ML: 0.9 INJECTION, SOLUTION INTRAVENOUS at 05:10

## 2022-10-28 NOTE — ED PROVIDER NOTES
"Encounter Date: 10/28/2022       History     Chief Complaint   Patient presents with    Weakness     Generalized weakness and dizziness upon standing. Pt reports that he had back surgery on Oct. 11 and was discharged home yesterday.      HPI  Rico Richards is a 71 y.o. male who presents with 2 weeks of gradual onset, constant, severe fatigue and generalized weakness that has been present ever since spine surgery (he's unsure of exact detail and no OSH records available, but appears to have been L4-L5-S1 fusion) with Dr. Perez at [facility] in [place] on 10/11.  He hasn't felt better ever since the surgery, and was discharged from [facility] yesterday.  He describes the weakness as fatigue to the point of being unable to walk. He denies leg weakness in particular.  He had chills yesterday.  He reports that at [facility], he had persistent high heart rate and was "given something for it." He's been compliant with levothyroxine.     Review of patient's allergies indicates:   Allergen Reactions    Oxycodone hcl-oxycodone-asa     Pcn [penicillins]     Tramadol Itching     Past Medical History:   Diagnosis Date    Arthritis     BPH (benign prostatic hyperplasia)     Cataract     GERD (gastroesophageal reflux disease)     Hyperlipidemia     Hypertension     Kidney stones     Thyroid disease      Past Surgical History:   Procedure Laterality Date    BACK SURGERY      CATARACT EXTRACTION      right eye    KNEE SURGERY      left    THYROIDECTOMY, PARTIAL      TONSILLECTOMY       Family History   Problem Relation Age of Onset    No Known Problems Mother     No Known Problems Father     Diabetes Paternal Grandmother      Social History     Tobacco Use    Smoking status: Every Day     Packs/day: 1.00     Types: Cigarettes    Smokeless tobacco: Never   Substance Use Topics    Alcohol use: No    Drug use: No     Review of Systems   Constitutional:  Positive for chills. Negative for fever.   HENT: Negative.   "   Eyes: Negative.    Respiratory:  Positive for shortness of breath. Negative for cough.    Cardiovascular: Negative.  Negative for chest pain.   Gastrointestinal: Negative.  Negative for diarrhea and vomiting.   Endocrine: Negative.    Genitourinary: Negative.  Negative for dysuria.   Musculoskeletal:  Positive for back pain (unchanged after surgery).   Skin:  Positive for wound (well-healing surgical incisions).   Allergic/Immunologic: Negative.    Neurological:  Positive for weakness (generalized).   Hematological: Negative.    Psychiatric/Behavioral: Negative.     All other systems reviewed and are negative.    Physical Exam     Initial Vitals [10/28/22 1334]   BP Pulse Resp Temp SpO2   (!) 164/106 (!) 113 17 97.5 °F (36.4 °C) 100 %      MAP       --         Physical Exam    Nursing note and vitals reviewed.  Constitutional: He appears well-developed. No distress.   Appears fatigued   HENT:   Head: Normocephalic and atraumatic.   Mucous membranes dry   Eyes: Conjunctivae and EOM are normal. Pupils are equal, round, and reactive to light.   Neck: Neck supple.   Cardiovascular:  Regular rhythm, normal heart sounds and intact distal pulses.           tachycardic    Pulmonary/Chest: Breath sounds normal. No respiratory distress.   Abdominal: Abdomen is soft. He exhibits no distension. There is no abdominal tenderness.   Musculoskeletal:         General: No edema. Normal range of motion.      Cervical back: Neck supple.     Neurological: He is alert and oriented to person, place, and time. No cranial nerve deficit. GCS score is 15. GCS eye subscore is 4. GCS verbal subscore is 5. GCS motor subscore is 6.   4/5 strength x4 extremities   Skin: Skin is warm and dry. Capillary refill takes less than 2 seconds.   Well-healing anterior abdominal incision and posterior lumbar incisions, c/d/i with no erythema or fluctuance   Psychiatric: He has a normal mood and affect. His behavior is normal. Judgment and thought content  normal.       ED Course   Procedures  Labs Reviewed   COMPREHENSIVE METABOLIC PANEL - Abnormal; Notable for the following components:       Result Value    CO2 21 (*)     Alkaline Phosphatase 154 (*)     ALT 55 (*)     All other components within normal limits   CBC W/ AUTO DIFFERENTIAL - Abnormal; Notable for the following components:    Platelets 504 (*)     All other components within normal limits   D DIMER, QUANTITATIVE - Abnormal; Notable for the following components:    D-Dimer 1.91 (*)     All other components within normal limits   TSH - Abnormal; Notable for the following components:    TSH 0.265 (*)     All other components within normal limits   URINALYSIS, REFLEX TO URINE CULTURE - Abnormal; Notable for the following components:    Protein, UA 1+ (*)     Leukocytes, UA Trace (*)     All other components within normal limits    Narrative:     Specimen Source->Urine   DRUG SCREEN PANEL, URINE EMERGENCY - Abnormal; Notable for the following components:    Opiate Scrn, Ur Presumptive Positive (*)     THC Presumptive Positive (*)     All other components within normal limits    Narrative:     Specimen Source->Urine   CULTURE, BLOOD   CULTURE, BLOOD   HIV 1 / 2 ANTIBODY    Narrative:     Release to patient->Immediate   HEPATITIS C ANTIBODY    Narrative:     Release to patient->Immediate   HEP C VIRUS HOLD SPECIMEN    Narrative:     Release to patient->Immediate   LACTIC ACID, PLASMA   TROPONIN I   B-TYPE NATRIURETIC PEPTIDE   MAGNESIUM   T4, FREE   URINALYSIS MICROSCOPIC    Narrative:     Specimen Source->Urine     EKG Readings: (Independently Interpreted)   EKG Interpretation by Emergency Physician: Luis Miguel Thomas MD  Rhythm: sinus tachycardia with PACs  Rate: 109 bpm  No ST-T changes concerning for acute ischemia  Leftward axis deviation.    Normal intervals.         Imaging Results              CTA Chest Non-Coronary (PE Studies) (Final result)  Result time 10/28/22 16:10:07      Final result by Kalen WAGNER  MD Renaldo (10/28/22 16:10:07)                   Impression:      No evidence of pulmonary embolism.    All CT scans at this location are performed using dose modulation techniques as appropriate to a performed exam including the following: Automated exposure control; adjustment of the mA and/or kV  according to patient size.      Electronically signed by: Kalen Macario  Date:    10/28/2022  Time:    16:10               Narrative:    EXAMINATION:  CTA CHEST NON CORONARY (PE STUDIES)    CLINICAL HISTORY:  Pulmonary embolism (PE) suspected, positive D-dimer;    TECHNIQUE:  Low dose axial images, sagittal and coronal reformations were obtained from the thoracic inlet to the lung bases CT PE protocol following the IV administration of 100 mL of Omnipaque 350.  MIP images also provided.    COMPARISON:  None    FINDINGS:  Base of Neck: No significant abnormality.    Thoracic soft tissues: Unremarkable.    Aorta: Left-sided aortic arch.  No aneurysm.    Heart: Normal size. No effusion. Mild aortic and coronary artery atherosclerotic calcification.    Pulmonary vasculature: Pulmonary arteries distribute normally.  No evidence of pulmonary embolism.    Evelyn/Mediastinum: No pathologic marlin enlargement.  Calcified mediastinal and hilar lymph nodes.    Esophagus: Unremarkable.    Upper Abdomen: No abnormality of the partially imaged upper abdomen.    Airways: Patent.    Lungs/Pleura: Clear lungs. No pleural effusion or thickening.    Bones: No acute fracture. No suspicious lytic or sclerotic lesions.                                       CT Lumbar Spine Without Contrast (Final result)  Result time 10/28/22 15:24:31      Final result by Kalen Macario MD (10/28/22 15:24:31)                   Impression:      No abnormal periprosthetic lucency or convincing evidence of discitis osteomyelitis.    All CT scans at this facility are performed  using dose modulation techniques as appropriate to performed exam including the following:   automated exposure control; adjustment of mA and/or kV according to the patients size (this includes techniques or standardized protocols for targeted exams where dose is matched to indication/reason for exam: i.e. extremities or head);  iterative reconstruction technique.      Electronically signed by: Kalen Macario  Date:    10/28/2022  Time:    15:24               Narrative:    EXAMINATION:  CT LUMBAR SPINE WITHOUT CONTRAST    CLINICAL HISTORY:  Low back pain, infection suspected;postoperative spine surgery on 10/11;    TECHNIQUE:  Standard noncontrast CT scan of the lumbar spine.    COMPARISON:  None    FINDINGS:  Posterior fixation hardware L4 through S1.  No abnormal periprosthetic lucency.  Anterior fixation L5-S1.  There is a single anterior screw at L4.    Alignment is grossly normal.  Trace free air in the left retroperitoneum anterior to the psoas muscle likely related to surgical approach.    T12-L1: Moderate disc height loss with Schmorl's node formation    L1-2: Severe disc height loss, bone-on-bone, with endplate sclerosis and vacuum phenomenon.  Severe bilateral neural foraminal stenosis.    L2-3: Moderate severity disc height loss with vacuum phenomenon.  Moderate Schmorl's node formation.  Moderate neural foraminal stenosis.    L3-4: Severe disc height loss.  Moderate neural foraminal stenosis.  Moderate facet arthropathy.    L4-5: Intervertebral disc spacer.  Moderate facet arthropathy.    L5-S1: Intervertebral disc spacer.  Moderate facet arthropathy.                                       X-Ray Chest AP Portable (Final result)  Result time 10/28/22 14:57:11      Final result by Timothy Woody MD (10/28/22 14:57:11)                   Impression:      No acute process seen.      Electronically signed by: Timothy Woody MD  Date:    10/28/2022  Time:    14:57               Narrative:    EXAMINATION:  XR CHEST AP PORTABLE    CLINICAL HISTORY:  Shortness of breath    FINDINGS:  Single view of the  chest.  Comparison 03/29/2017    Cardiac silhouette is normal.  The lungs demonstrate no evidence of active disease.  No evidence of pleural effusion or pneumothorax.  Bones appear intact.                                       Medications   sodium chloride 0.9% bolus 1,000 mL (0 mLs Intravenous Stopped 10/28/22 1542)   iohexoL (OMNIPAQUE 350) injection 100 mL (100 mLs Intravenous Given 10/28/22 1553)   sodium chloride 0.9% bolus 500 mL (0 mLs Intravenous Stopped 10/28/22 1735)                 ED Course as of 10/30/22 2001   Fri Oct 28, 2022   1616 Postvoid residual bladder scan = 51 mL.  [ND]      ED Course User Index  [ND] Luis Miguel Thomas MD        No results found for this or any previous visit (from the past 24 hour(s)).    Pt reports symptoms significant improved after IV fluids.  Encouraged taking full advantage of home health with PT, minimizing sedative medication, and strict return precautions discussed. -    Patient's evaluation in the ED does not suggest any emergent or life threatening medical conditions requiring immediate intervention beyond what was provided in the ED, and I believe patient is safe for discharge.  Regardless, an unremarkable evaluation in the ED does not preclude the development or presence of a serious or life threatening condition. As such, patient was given return instructions for any change or worsening in symptoms.           Clinical Impression:   Final diagnoses:  [R53.83] Fatigue  [R06.02] SOB (shortness of breath)  [R53.81] Debility  [Z79.899] Polypharmacy  [E86.0] Dehydration (Primary)        ED Disposition Condition    Discharge Stable          ED Prescriptions    None       Follow-up Information       Follow up With Specialties Details Why Contact Info    Sampson Velazquez MD Internal Medicine Schedule an appointment as soon as possible for a visit   [address]  THE [facility] [address] [phone]      with home health  Call in 1 day       [facility] [place] - Emergency Dept Emergency Medicine  As needed, If symptoms worsen [address]  [facility] [id number] [phone]             uLis Miguel Thomas MD  10/30/22 2002

## 2022-10-30 LAB
HCV AB SERPL QL IA: NEGATIVE
HEP C VIRUS HOLD SPECIMEN: NORMAL
HIV 1+2 AB+HIV1 P24 AG SERPL QL IA: NEGATIVE

## 2022-10-31 ENCOUNTER — HOSPITAL ENCOUNTER (EMERGENCY)
Facility: HOSPITAL | Age: 72
Discharge: HOME | End: 2022-10-31
Attending: EMERGENCY MEDICINE
Payer: MEDICARE

## 2022-10-31 VITALS
RESPIRATION RATE: 19 BRPM | TEMPERATURE: 99 F | OXYGEN SATURATION: 98 % | BODY MASS INDEX: 25 KG/M2 | HEART RATE: 88 BPM | SYSTOLIC BLOOD PRESSURE: 185 MMHG | WEIGHT: 194.81 LBS | HEIGHT: 74 IN | DIASTOLIC BLOOD PRESSURE: 109 MMHG

## 2022-10-31 DIAGNOSIS — R00.0 TACHYCARDIA: ICD-10-CM

## 2022-10-31 DIAGNOSIS — M79.605 BILATERAL LEG PAIN: Primary | ICD-10-CM

## 2022-10-31 DIAGNOSIS — M79.604 BILATERAL LEG PAIN: Primary | ICD-10-CM

## 2022-10-31 LAB
ALBUMIN SERPL BCP-MCNC: 3.7 G/DL (ref 3.5–5.2)
ALP SERPL-CCNC: 120 U/L (ref 55–135)
ALT SERPL W/O P-5'-P-CCNC: 50 U/L (ref 10–44)
ANION GAP SERPL CALC-SCNC: 12 MMOL/L (ref 8–16)
AST SERPL-CCNC: 33 U/L (ref 10–40)
BASOPHILS # BLD AUTO: 0.09 K/UL (ref 0–0.2)
BASOPHILS NFR BLD: 1 % (ref 0–1.9)
BILIRUB SERPL-MCNC: 0.7 MG/DL (ref 0.1–1)
BILIRUB UR QL STRIP: NEGATIVE
BNP SERPL-MCNC: 41 PG/ML (ref 0–99)
BUN SERPL-MCNC: 10 MG/DL (ref 8–23)
CALCIUM SERPL-MCNC: 9.4 MG/DL (ref 8.7–10.5)
CHLORIDE SERPL-SCNC: 104 MMOL/L (ref 95–110)
CK SERPL-CCNC: 116 U/L (ref 20–200)
CLARITY UR REFRACT.AUTO: CLEAR
CO2 SERPL-SCNC: 22 MMOL/L (ref 23–29)
COLOR UR AUTO: YELLOW
CREAT SERPL-MCNC: 0.8 MG/DL (ref 0.5–1.4)
DIFFERENTIAL METHOD: ABNORMAL
EOSINOPHIL # BLD AUTO: 0.2 K/UL (ref 0–0.5)
EOSINOPHIL NFR BLD: 2.1 % (ref 0–8)
ERYTHROCYTE [DISTWIDTH] IN BLOOD BY AUTOMATED COUNT: 13.6 % (ref 11.5–14.5)
EST. GFR  (NO RACE VARIABLE): >60 ML/MIN/1.73 M^2
GLUCOSE SERPL-MCNC: 121 MG/DL (ref 70–110)
GLUCOSE UR QL STRIP: NEGATIVE
HCT VFR BLD AUTO: 41.5 % (ref 40–54)
HGB BLD-MCNC: 14 G/DL (ref 14–18)
HGB UR QL STRIP: NEGATIVE
IMM GRANULOCYTES # BLD AUTO: 0.03 K/UL (ref 0–0.04)
IMM GRANULOCYTES NFR BLD AUTO: 0.3 % (ref 0–0.5)
KETONES UR QL STRIP: ABNORMAL
LEUKOCYTE ESTERASE UR QL STRIP: NEGATIVE
LYMPHOCYTES # BLD AUTO: 2.8 K/UL (ref 1–4.8)
LYMPHOCYTES NFR BLD: 30 % (ref 18–48)
MCH RBC QN AUTO: 29.7 PG (ref 27–31)
MCHC RBC AUTO-ENTMCNC: 33.7 G/DL (ref 32–36)
MCV RBC AUTO: 88 FL (ref 82–98)
MONOCYTES # BLD AUTO: 0.9 K/UL (ref 0.3–1)
MONOCYTES NFR BLD: 9.2 % (ref 4–15)
NEUTROPHILS # BLD AUTO: 5.3 K/UL (ref 1.8–7.7)
NEUTROPHILS NFR BLD: 57.4 % (ref 38–73)
NITRITE UR QL STRIP: NEGATIVE
NRBC BLD-RTO: 0 /100 WBC
PH UR STRIP: 6 [PH] (ref 5–8)
PLATELET # BLD AUTO: 530 K/UL (ref 150–450)
PMV BLD AUTO: 9.8 FL (ref 9.2–12.9)
POTASSIUM SERPL-SCNC: 3.5 MMOL/L (ref 3.5–5.1)
PROT SERPL-MCNC: 7.4 G/DL (ref 6–8.4)
PROT UR QL STRIP: NEGATIVE
RBC # BLD AUTO: 4.71 M/UL (ref 4.6–6.2)
SODIUM SERPL-SCNC: 138 MMOL/L (ref 136–145)
SP GR UR STRIP: 1.01 (ref 1–1.03)
TROPONIN I SERPL DL<=0.01 NG/ML-MCNC: 0.02 NG/ML (ref 0–0.03)
URN SPEC COLLECT METH UR: ABNORMAL
UROBILINOGEN UR STRIP-ACNC: NEGATIVE EU/DL
WBC # BLD AUTO: 9.26 K/UL (ref 3.9–12.7)

## 2022-10-31 PROCEDURE — 81003 URINALYSIS AUTO W/O SCOPE: CPT | Mod: ER | Performed by: EMERGENCY MEDICINE

## 2022-10-31 PROCEDURE — 84484 ASSAY OF TROPONIN QUANT: CPT | Mod: ER | Performed by: EMERGENCY MEDICINE

## 2022-10-31 PROCEDURE — 82550 ASSAY OF CK (CPK): CPT | Mod: ER | Performed by: EMERGENCY MEDICINE

## 2022-10-31 PROCEDURE — 80053 COMPREHEN METABOLIC PANEL: CPT | Mod: ER | Performed by: EMERGENCY MEDICINE

## 2022-10-31 PROCEDURE — 99285 EMERGENCY DEPT VISIT HI MDM: CPT | Mod: 25,ER

## 2022-10-31 PROCEDURE — 93005 ELECTROCARDIOGRAM TRACING: CPT | Mod: ER

## 2022-10-31 PROCEDURE — 85025 COMPLETE CBC W/AUTO DIFF WBC: CPT | Mod: ER | Performed by: EMERGENCY MEDICINE

## 2022-10-31 PROCEDURE — 25000003 PHARM REV CODE 250: Mod: ER | Performed by: EMERGENCY MEDICINE

## 2022-10-31 PROCEDURE — 93010 ELECTROCARDIOGRAM REPORT: CPT | Mod: ,,, | Performed by: INTERNAL MEDICINE

## 2022-10-31 PROCEDURE — 96360 HYDRATION IV INFUSION INIT: CPT | Mod: ER

## 2022-10-31 PROCEDURE — 83880 ASSAY OF NATRIURETIC PEPTIDE: CPT | Mod: ER | Performed by: EMERGENCY MEDICINE

## 2022-10-31 PROCEDURE — 93010 EKG 12-LEAD: ICD-10-PCS | Mod: ,,, | Performed by: INTERNAL MEDICINE

## 2022-10-31 RX ORDER — MORPHINE SULFATE 4 MG/ML
4 INJECTION, SOLUTION INTRAMUSCULAR; INTRAVENOUS
Status: DISCONTINUED | OUTPATIENT
Start: 2022-10-31 | End: 2022-10-31 | Stop reason: HOSPADM

## 2022-10-31 RX ADMIN — SODIUM CHLORIDE 1000 ML: 0.9 INJECTION, SOLUTION INTRAVENOUS at 05:10

## 2022-10-31 NOTE — ED PROVIDER NOTES
Encounter Date: 10/31/2022       History     Chief Complaint   Patient presents with    Leg Pain     Tayo leg pain - thighs started last night. Back surgery on 11th     The history is provided by the patient.   Leg Pain   There was no injury mechanism. The incident occurred several days ago. The pain is present in the left thigh and right thigh. The pain has been Constant since onset. Pertinent negatives include no numbness, no inability to bear weight, no loss of motion, no muscle weakness, no loss of sensation and no tingling.   Review of patient's allergies indicates:   Allergen Reactions    Oxycodone hcl-oxycodone-asa     Pcn [penicillins]     Tramadol Itching     Past Medical History:   Diagnosis Date    Arthritis     BPH (benign prostatic hyperplasia)     Cataract     GERD (gastroesophageal reflux disease)     Hyperlipidemia     Hypertension     Kidney stones     Thyroid disease      Past Surgical History:   Procedure Laterality Date    BACK SURGERY      CATARACT EXTRACTION      right eye    KNEE SURGERY      left    THYROIDECTOMY, PARTIAL      TONSILLECTOMY       Family History   Problem Relation Age of Onset    No Known Problems Mother     No Known Problems Father     Diabetes Paternal Grandmother      Social History     Tobacco Use    Smoking status: Every Day     Packs/day: 1.00     Types: Cigarettes    Smokeless tobacco: Never   Substance Use Topics    Alcohol use: No    Drug use: No     Review of Systems   Constitutional:  Negative for fever.   HENT:  Negative for sore throat.    Respiratory:  Negative for shortness of breath.    Cardiovascular:  Negative for chest pain.   Gastrointestinal:  Negative for nausea.   Genitourinary:  Negative for dysuria.   Musculoskeletal:  Negative for back pain.   Skin:  Negative for rash.   Neurological:  Negative for tingling, weakness and numbness.   Hematological:  Does not bruise/bleed easily.     Physical Exam     Initial Vitals [10/31/22 1638]   BP Pulse Resp Temp  SpO2   (!) 143/99 (!) 139 20 98.9 °F (37.2 °C) 99 %      MAP       --         Physical Exam    Nursing note and vitals reviewed.  Constitutional: He appears well-developed and well-nourished. No distress.   HENT:   Head: Normocephalic and atraumatic.   Mouth/Throat: Oropharynx is clear and moist.   Eyes: Conjunctivae and EOM are normal. Pupils are equal, round, and reactive to light.   Neck: Neck supple.   Normal range of motion.  Cardiovascular:  Regular rhythm and normal heart sounds.   Tachycardia present.   Exam reveals no gallop and no friction rub.       No murmur heard.  Pulmonary/Chest: Breath sounds normal. No respiratory distress. He has no wheezes. He has no rhonchi. He has no rales.   Abdominal: Abdomen is soft. Bowel sounds are normal. He exhibits no distension and no mass. There is no abdominal tenderness. There is no rebound and no guarding.   Musculoskeletal:         General: No edema. Normal range of motion.      Cervical back: Normal range of motion and neck supple.     Neurological: He is alert and oriented to person, place, and time. He has normal strength.   Skin: Skin is warm and dry. No rash noted.   Psychiatric: He has a normal mood and affect. Thought content normal.       ED Course   Procedures  Labs Reviewed   CBC W/ AUTO DIFFERENTIAL - Abnormal; Notable for the following components:       Result Value    Platelets 530 (*)     All other components within normal limits   COMPREHENSIVE METABOLIC PANEL - Abnormal; Notable for the following components:    CO2 22 (*)     Glucose 121 (*)     ALT 50 (*)     All other components within normal limits   URINALYSIS, REFLEX TO URINE CULTURE - Abnormal; Notable for the following components:    Ketones, UA Trace (*)     All other components within normal limits    Narrative:     Specimen Source->Urine   B-TYPE NATRIURETIC PEPTIDE   CK   TROPONIN I     EKG Readings: (Independently Interpreted)   Rhythm: Sinus Tachycardia. Heart Rate: 131. Ectopy: No  Ectopy. Conduction: Normal. ST Segments: Normal ST Segments. T Waves: Normal. Clinical Impression: Normal Sinus Rhythm     Imaging Results              X-Ray Chest AP Portable (Final result)  Result time 10/31/22 17:42:05      Final result by Danielle Carpenter MD (10/31/22 17:42:05)                   Impression:      No acute abnormality.      Electronically signed by: Jayden Santos  Date:    10/31/2022  Time:    17:42               Narrative:    EXAMINATION:  XR CHEST AP PORTABLE    CLINICAL HISTORY:  tachycardia;    TECHNIQUE:  Single frontal view of the chest was performed.    COMPARISON:  None    FINDINGS:  The lungs are clear, with normal appearance of pulmonary vasculature and no pleural effusion or pneumothorax.    The cardiac silhouette is normal in size. The hilar and mediastinal contours are unremarkable.    Bones are intact.                                       Medications   sodium chloride 0.9% bolus 1,000 mL (0 mLs Intravenous Stopped 10/31/22 1918)     Medical Decision Making:   History:   Old Records Summarized: records from previous admission(s).       <> Summary of Records: EXAMINATION:  CT LUMBAR SPINE WITHOUT CONTRAST     CLINICAL HISTORY:  Low back pain, infection suspected;postoperative spine surgery on 10/11;     TECHNIQUE:  Standard noncontrast CT scan of the lumbar spine.     COMPARISON:  None     FINDINGS:  Posterior fixation hardware L4 through S1.  No abnormal periprosthetic lucency.  Anterior fixation L5-S1.  There is a single anterior screw at L4.     Alignment is grossly normal.  Trace free air in the left retroperitoneum anterior to the psoas muscle likely related to surgical approach.     T12-L1: Moderate disc height loss with Schmorl's node formation     L1-2: Severe disc height loss, bone-on-bone, with endplate sclerosis and vacuum phenomenon.  Severe bilateral neural foraminal stenosis.     L2-3: Moderate severity disc height loss with vacuum phenomenon.  Moderate Schmorl's node  formation.  Moderate neural foraminal stenosis.     L3-4: Severe disc height loss.  Moderate neural foraminal stenosis.  Moderate facet arthropathy.     L4-5: Intervertebral disc spacer.  Moderate facet arthropathy.     L5-S1: Intervertebral disc spacer.  Moderate facet arthropathy.     Impression:     No abnormal periprosthetic lucency or convincing evidence of discitis osteomyelitis.     All CT scans at this facility are performed  using dose modulation techniques as appropriate to performed exam including the following:  automated exposure control; adjustment of mA and/or kV according to the patients size (this includes techniques or standardized protocols for targeted exams where dose is matched to indication/reason for exam: i.e. extremities or head);  iterative reconstruction technique.      Electronically signed by: Kalen Macario  Date:                                            10/28/2022  Time:                                           15:24        EXAMINATION:  CTA CHEST NON CORONARY (PE STUDIES)     CLINICAL HISTORY:  Pulmonary embolism (PE) suspected, positive D-dimer;     TECHNIQUE:  Low dose axial images, sagittal and coronal reformations were obtained from the thoracic inlet to the lung bases CT PE protocol following the IV administration of 100 mL of Omnipaque 350.  MIP images also provided.     COMPARISON:  None     FINDINGS:  Base of Neck: No significant abnormality.     Thoracic soft tissues: Unremarkable.     Aorta: Left-sided aortic arch.  No aneurysm.     Heart: Normal size. No effusion. Mild aortic and coronary artery atherosclerotic calcification.     Pulmonary vasculature: Pulmonary arteries distribute normally.  No evidence of pulmonary embolism.     Evelyn/Mediastinum: No pathologic marlin enlargement.  Calcified mediastinal and hilar lymph nodes.     Esophagus: Unremarkable.     Upper Abdomen: No abnormality of the partially imaged upper abdomen.     Airways: Patent.     Lungs/Pleura: Clear  lungs. No pleural effusion or thickening.     Bones: No acute fracture. No suspicious lytic or sclerotic lesions.     Impression:     No evidence of pulmonary embolism.     All CT scans at this location are performed using dose modulation techniques as appropriate to a performed exam including the following: Automated exposure control; adjustment of the mA and/or kV  according to patient size.        Electronically signed by: Kalen Macario  Date:                                            10/28/2022  Time:                                           16:10  Independently Interpreted Test(s):   I have ordered and independently interpreted EKG Reading(s) - see prior notes  Clinical Tests:   Lab Tests: Ordered and Reviewed  Radiological Study: Ordered and Reviewed           ED Course as of 11/01/22 0505   Mon Oct 31, 2022   8267 The patient was received from the off-going emergency room physician Dr Alvarenga.  All pertinent details presently available from the patient encounter were discussed along with the expected plan for disposition.   [ST]   2012 All findings were reviewed with the patient/family in detail.  I see no indication of an emergent process beyond that addressed during our encounter but have duly counseled the patient/family regarding the need for prompt follow-up as well as the indications that should prompt immediate return to the emergency room should new or worrisome developments occur.  The patient has additionally been provided with printed information regarding diagnosis as well as instructions regarding follow up and any medications intended to manage the patient's aforementioned conditions.  The patient/family communicates understanding of all this information and all remaining questions and concerns were addressed at this time.   [ST]      ED Course User Index  [ST] Jimenez Kerr MD                 Clinical Impression:   Final diagnoses:  [R00.0] Tachycardia  [M79.604, M79.605] Bilateral leg  pain (Primary)        ED Disposition Condition    Discharge Stable          ED Prescriptions    None       Follow-up Information       Follow up With Specialties Details Why Contact Info    C Magdiel Perez MD Orthopedic Surgery Go in 1 day As scheduled [address] [facility]  [address]  [facility] [facility] [address]  [phone]      [facility] Emergency Dept Emergency Medicine Go to  As needed, If symptoms worsen [address]  [facility] [address] [phone]             Jimenez Kerr MD  11/01/22 0500

## 2022-11-04 LAB
BACTERIA BLD CULT: NORMAL
BACTERIA BLD CULT: NORMAL

## 2023-09-26 ENCOUNTER — HOSPITAL ENCOUNTER (EMERGENCY)
Facility: HOSPITAL | Age: 73
Discharge: HOME | End: 2023-09-26
Attending: EMERGENCY MEDICINE
Payer: MEDICARE

## 2023-09-26 VITALS
BODY MASS INDEX: 25.65 KG/M2 | HEART RATE: 60 BPM | RESPIRATION RATE: 14 BRPM | OXYGEN SATURATION: 95 % | HEIGHT: 74 IN | WEIGHT: 199.88 LBS | SYSTOLIC BLOOD PRESSURE: 166 MMHG | DIASTOLIC BLOOD PRESSURE: 81 MMHG | TEMPERATURE: 98 F

## 2023-09-26 DIAGNOSIS — R42 DIZZINESS: ICD-10-CM

## 2023-09-26 DIAGNOSIS — R07.9 CHEST PAIN: ICD-10-CM

## 2023-09-26 DIAGNOSIS — R07.9 CHEST PAIN, UNSPECIFIED: ICD-10-CM

## 2023-09-26 DIAGNOSIS — I95.1 ORTHOSTASIS: Primary | ICD-10-CM

## 2023-09-26 LAB
ALBUMIN SERPL BCP-MCNC: 4.1 G/DL (ref 3.5–5.2)
ALP SERPL-CCNC: 62 U/L (ref 55–135)
ALT SERPL W/O P-5'-P-CCNC: 15 U/L (ref 10–44)
ANION GAP SERPL CALC-SCNC: 11 MMOL/L (ref 8–16)
AST SERPL-CCNC: 16 U/L (ref 10–40)
BASOPHILS # BLD AUTO: 0.09 K/UL (ref 0–0.2)
BASOPHILS NFR BLD: 0.8 % (ref 0–1.9)
BILIRUB SERPL-MCNC: 0.4 MG/DL (ref 0.1–1)
BILIRUB UR QL STRIP: NEGATIVE
BNP SERPL-MCNC: 36 PG/ML (ref 0–99)
BUN SERPL-MCNC: 15 MG/DL (ref 8–23)
CALCIUM SERPL-MCNC: 9.2 MG/DL (ref 8.7–10.5)
CHLORIDE SERPL-SCNC: 107 MMOL/L (ref 95–110)
CK SERPL-CCNC: 69 U/L (ref 20–200)
CLARITY UR REFRACT.AUTO: CLEAR
CO2 SERPL-SCNC: 21 MMOL/L (ref 23–29)
COLOR UR AUTO: YELLOW
CREAT SERPL-MCNC: 1 MG/DL (ref 0.5–1.4)
CTP QC/QA: YES
CTP QC/QA: YES
DIFFERENTIAL METHOD: ABNORMAL
EOSINOPHIL # BLD AUTO: 0.3 K/UL (ref 0–0.5)
EOSINOPHIL NFR BLD: 2.7 % (ref 0–8)
ERYTHROCYTE [DISTWIDTH] IN BLOOD BY AUTOMATED COUNT: 13.7 % (ref 11.5–14.5)
EST. GFR  (NO RACE VARIABLE): >60 ML/MIN/1.73 M^2
GLUCOSE SERPL-MCNC: 152 MG/DL (ref 70–110)
GLUCOSE UR QL STRIP: NEGATIVE
HCT VFR BLD AUTO: 43 % (ref 40–54)
HGB BLD-MCNC: 14.7 G/DL (ref 14–18)
HGB UR QL STRIP: NEGATIVE
IMM GRANULOCYTES # BLD AUTO: 0.04 K/UL (ref 0–0.04)
IMM GRANULOCYTES NFR BLD AUTO: 0.4 % (ref 0–0.5)
KETONES UR QL STRIP: NEGATIVE
LACTATE SERPL-SCNC: 1.5 MMOL/L (ref 0.5–2.2)
LEUKOCYTE ESTERASE UR QL STRIP: NEGATIVE
LYMPHOCYTES # BLD AUTO: 3.2 K/UL (ref 1–4.8)
LYMPHOCYTES NFR BLD: 29.5 % (ref 18–48)
MCH RBC QN AUTO: 31.2 PG (ref 27–31)
MCHC RBC AUTO-ENTMCNC: 34.2 G/DL (ref 32–36)
MCV RBC AUTO: 91 FL (ref 82–98)
MONOCYTES # BLD AUTO: 1.1 K/UL (ref 0.3–1)
MONOCYTES NFR BLD: 10.3 % (ref 4–15)
NEUTROPHILS # BLD AUTO: 6.2 K/UL (ref 1.8–7.7)
NEUTROPHILS NFR BLD: 56.3 % (ref 38–73)
NITRITE UR QL STRIP: NEGATIVE
NRBC BLD-RTO: 0 /100 WBC
PH UR STRIP: 7 [PH] (ref 5–8)
PLATELET # BLD AUTO: 274 K/UL (ref 150–450)
PMV BLD AUTO: 9 FL (ref 9.2–12.9)
POC MOLECULAR INFLUENZA A AGN: NEGATIVE
POC MOLECULAR INFLUENZA B AGN: NEGATIVE
POTASSIUM SERPL-SCNC: 3.6 MMOL/L (ref 3.5–5.1)
PROT SERPL-MCNC: 6.9 G/DL (ref 6–8.4)
PROT UR QL STRIP: NEGATIVE
RBC # BLD AUTO: 4.71 M/UL (ref 4.6–6.2)
SARS-COV-2 RDRP RESP QL NAA+PROBE: NEGATIVE
SODIUM SERPL-SCNC: 139 MMOL/L (ref 136–145)
SP GR UR STRIP: <=1.005 (ref 1–1.03)
TROPONIN I SERPL DL<=0.01 NG/ML-MCNC: 0.02 NG/ML (ref 0–0.03)
URN SPEC COLLECT METH UR: ABNORMAL
UROBILINOGEN UR STRIP-ACNC: NEGATIVE EU/DL
WBC # BLD AUTO: 10.97 K/UL (ref 3.9–12.7)

## 2023-09-26 PROCEDURE — 83880 ASSAY OF NATRIURETIC PEPTIDE: CPT | Mod: ER | Performed by: EMERGENCY MEDICINE

## 2023-09-26 PROCEDURE — 83605 ASSAY OF LACTIC ACID: CPT | Mod: ER | Performed by: EMERGENCY MEDICINE

## 2023-09-26 PROCEDURE — 81003 URINALYSIS AUTO W/O SCOPE: CPT | Mod: ER | Performed by: EMERGENCY MEDICINE

## 2023-09-26 PROCEDURE — 93010 EKG 12-LEAD: ICD-10-PCS | Mod: ,,, | Performed by: INTERNAL MEDICINE

## 2023-09-26 PROCEDURE — 93010 ELECTROCARDIOGRAM REPORT: CPT | Mod: ,,, | Performed by: INTERNAL MEDICINE

## 2023-09-26 PROCEDURE — 82550 ASSAY OF CK (CPK): CPT | Mod: ER | Performed by: EMERGENCY MEDICINE

## 2023-09-26 PROCEDURE — 93005 ELECTROCARDIOGRAM TRACING: CPT | Mod: ER

## 2023-09-26 PROCEDURE — 85025 COMPLETE CBC W/AUTO DIFF WBC: CPT | Mod: ER | Performed by: EMERGENCY MEDICINE

## 2023-09-26 PROCEDURE — 99285 EMERGENCY DEPT VISIT HI MDM: CPT | Mod: 25,ER

## 2023-09-26 PROCEDURE — 87635 SARS-COV-2 COVID-19 AMP PRB: CPT | Mod: ER | Performed by: EMERGENCY MEDICINE

## 2023-09-26 PROCEDURE — 96360 HYDRATION IV INFUSION INIT: CPT | Mod: ER

## 2023-09-26 PROCEDURE — 80053 COMPREHEN METABOLIC PANEL: CPT | Mod: ER | Performed by: EMERGENCY MEDICINE

## 2023-09-26 PROCEDURE — 84484 ASSAY OF TROPONIN QUANT: CPT | Mod: ER | Performed by: EMERGENCY MEDICINE

## 2023-09-26 PROCEDURE — 63600175 PHARM REV CODE 636 W HCPCS: Mod: ER | Performed by: EMERGENCY MEDICINE

## 2023-09-26 PROCEDURE — 87502 INFLUENZA DNA AMP PROBE: CPT | Mod: ER

## 2023-09-26 RX ADMIN — SODIUM CHLORIDE, POTASSIUM CHLORIDE, SODIUM LACTATE AND CALCIUM CHLORIDE 1000 ML: 600; 310; 30; 20 INJECTION, SOLUTION INTRAVENOUS at 09:09

## 2023-09-27 NOTE — ED PROVIDER NOTES
Encounter Date: 9/26/2023       History     Chief Complaint   Patient presents with    Dizziness     Dizziness x few hours. Pt. Reports elevated BP at home. Pt. Took a lisinopril at home that was previously prescribed. Pt. No longer on BP meds.     The history is provided by the patient.   Dizziness  This is a new problem. The current episode started 3 to 5 hours ago. The problem occurs constantly. The problem has not changed since onset.Associated symptoms include chest pain. Pertinent negatives include no abdominal pain, no headaches and no shortness of breath. Nothing aggravates the symptoms. Nothing relieves the symptoms.   Pt reports mild chest pain last night non-radiating sharp located central chest. Pr denies CP today. Pt took a Lisinopril tab today( has not taken in 2 years) for elevated BP earlier today.    Review of patient's allergies indicates:   Allergen Reactions    Oxycodone hcl-oxycodone-asa     Pcn [penicillins]     Tramadol Itching     Past Medical History:   Diagnosis Date    Arthritis     BPH (benign prostatic hyperplasia)     Cataract     GERD (gastroesophageal reflux disease)     Hyperlipidemia     Hypertension     Kidney stones     Thyroid disease      Past Surgical History:   Procedure Laterality Date    BACK SURGERY      CATARACT EXTRACTION      right eye    KNEE SURGERY      left    THYROIDECTOMY, PARTIAL      TONSILLECTOMY       Family History   Problem Relation Age of Onset    No Known Problems Mother     No Known Problems Father     Diabetes Paternal Grandmother      Social History     Tobacco Use    Smoking status: Every Day     Current packs/day: 1.00     Types: Cigarettes    Smokeless tobacco: Never   Substance Use Topics    Alcohol use: No    Drug use: No     Review of Systems   Constitutional:  Negative for fever.   HENT:  Negative for sore throat.    Respiratory:  Negative for shortness of breath.    Cardiovascular:  Positive for chest pain.   Gastrointestinal:  Negative for  abdominal pain and nausea.   Genitourinary:  Negative for dysuria.   Musculoskeletal:  Negative for back pain.   Skin:  Negative for rash.   Neurological:  Positive for dizziness. Negative for weakness and headaches.   Hematological:  Does not bruise/bleed easily.       Physical Exam     Initial Vitals [09/26/23 1848]   BP Pulse Resp Temp SpO2   (!) 175/85 106 20 98.1 °F (36.7 °C) 97 %      MAP       --         Physical Exam    Nursing note and vitals reviewed.  Constitutional: He appears well-developed and well-nourished.   Elderly   HENT:   Head: Normocephalic and atraumatic.   Mouth/Throat: Oropharynx is clear and moist.   Eyes: Conjunctivae and EOM are normal. Pupils are equal, round, and reactive to light.   Neck: Neck supple.   Normal range of motion.  Cardiovascular:  Normal rate, regular rhythm and normal heart sounds.           Pulmonary/Chest: Breath sounds normal.   Abdominal: Abdomen is soft. Bowel sounds are normal.   Musculoskeletal:         General: Normal range of motion.      Cervical back: Normal range of motion and neck supple.     Neurological: He is alert and oriented to person, place, and time. He has normal strength.   Skin: Skin is warm and dry.   Psychiatric: He has a normal mood and affect. Thought content normal.         ED Course   Procedures  Labs Reviewed   CBC W/ AUTO DIFFERENTIAL - Abnormal; Notable for the following components:       Result Value    MCH 31.2 (*)     MPV 9.0 (*)     Mono # 1.1 (*)     All other components within normal limits   COMPREHENSIVE METABOLIC PANEL - Abnormal; Notable for the following components:    CO2 21 (*)     Glucose 152 (*)     All other components within normal limits   URINALYSIS, REFLEX TO URINE CULTURE - Abnormal; Notable for the following components:    Specific Gravity, UA <=1.005 (*)     All other components within normal limits    Narrative:     Specimen Source->Urine   B-TYPE NATRIURETIC PEPTIDE   TROPONIN I   CK   LACTIC ACID, PLASMA    SARS-COV-2 RDRP GENE   POCT INFLUENZA A/B MOLECULAR     Results for orders placed or performed during the hospital encounter of 09/26/23   CBC Auto Differential   Result Value Ref Range    WBC 10.97 3.90 - 12.70 K/uL    RBC 4.71 4.60 - 6.20 M/uL    Hemoglobin 14.7 14.0 - 18.0 g/dL    Hematocrit 43.0 40.0 - 54.0 %    MCV 91 82 - 98 fL    MCH 31.2 (H) 27.0 - 31.0 pg    MCHC 34.2 32.0 - 36.0 g/dL    RDW 13.7 11.5 - 14.5 %    Platelets 274 150 - 450 K/uL    MPV 9.0 (L) 9.2 - 12.9 fL    Immature Granulocytes 0.4 0.0 - 0.5 %    Gran # (ANC) 6.2 1.8 - 7.7 K/uL    Immature Grans (Abs) 0.04 0.00 - 0.04 K/uL    Lymph # 3.2 1.0 - 4.8 K/uL    Mono # 1.1 (H) 0.3 - 1.0 K/uL    Eos # 0.3 0.0 - 0.5 K/uL    Baso # 0.09 0.00 - 0.20 K/uL    nRBC 0 0 /100 WBC    Gran % 56.3 38.0 - 73.0 %    Lymph % 29.5 18.0 - 48.0 %    Mono % 10.3 4.0 - 15.0 %    Eosinophil % 2.7 0.0 - 8.0 %    Basophil % 0.8 0.0 - 1.9 %    Differential Method Automated    Comprehensive Metabolic Panel   Result Value Ref Range    Sodium 139 136 - 145 mmol/L    Potassium 3.6 3.5 - 5.1 mmol/L    Chloride 107 95 - 110 mmol/L    CO2 21 (L) 23 - 29 mmol/L    Glucose 152 (H) 70 - 110 mg/dL    BUN 15 8 - 23 mg/dL    Creatinine 1.0 0.5 - 1.4 mg/dL    Calcium 9.2 8.7 - 10.5 mg/dL    Total Protein 6.9 6.0 - 8.4 g/dL    Albumin 4.1 3.5 - 5.2 g/dL    Total Bilirubin 0.4 0.1 - 1.0 mg/dL    Alkaline Phosphatase 62 55 - 135 U/L    AST 16 10 - 40 U/L    ALT 15 10 - 44 U/L    eGFR >60.0 >60 mL/min/1.73 m^2    Anion Gap 11 8 - 16 mmol/L   BNP   Result Value Ref Range    BNP 36 0 - 99 pg/mL   Troponin I   Result Value Ref Range    Troponin I 0.016 0.000 - 0.026 ng/mL   CK   Result Value Ref Range    CPK 69 20 - 200 U/L   Urinalysis, Reflex to Urine Culture Urine, Clean Catch    Specimen: Urine   Result Value Ref Range    Specimen UA Urine, Clean Catch     Color, UA Yellow Yellow, Straw, Elin    Appearance, UA Clear Clear    pH, UA 7.0 5.0 - 8.0    Specific Gravity, UA <=1.005 (A)  1.005 - 1.030    Protein, UA Negative Negative    Glucose, UA Negative Negative    Ketones, UA Negative Negative    Bilirubin (UA) Negative Negative    Occult Blood UA Negative Negative    Nitrite, UA Negative Negative    Urobilinogen, UA Negative <2.0 EU/dL    Leukocytes, UA Negative Negative   Lactic Acid, Plasma   Result Value Ref Range    Lactate (Lactic Acid) 1.5 0.5 - 2.2 mmol/L   POCT COVID-19 Rapid Screening   Result Value Ref Range    POC Rapid COVID Negative Negative     Acceptable Yes    POCT Influenza A/B Molecular   Result Value Ref Range    POC Molecular Influenza A Ag Negative Negative, Not Reported    POC Molecular Influenza B Ag Negative Negative, Not Reported     Acceptable Yes        EKG Readings: (Independently Interpreted)   Initial Reading: No STEMI. Rhythm: Sinus Arrhythmia. Heart Rate: 71. ST Segments: Normal ST Segments. T Waves: Normal. Axis: Normal. Clinical Impression: Sinus Arrhythmia       Imaging Results              CT Head Without Contrast (Final result)  Result time 09/26/23 20:15:48      Final result by Jayden Santos MD (09/26/23 20:15:48)                   Impression:      No acute abnormality.    Atrophy and chronic white matter changes    Question left-sided cataracts.    All CT scans   are performed using dose optimization techniques including the following: automated exposure control; adjustment of the mA and/or kV; use of iterative reconstruction technique.  Dose modulation was employed for ALARA by means of: Automated exposure control; adjustment of the mA and/or kV according to patient size (this includes techniques or standardized protocols for targeted exams where dose is matched to indication/reason for exam; i.e. extremities or head); and/or use of iterative reconstructive technique.      Electronically signed by: Jayden Santos  Date:    09/26/2023  Time:    20:15               Narrative:    EXAMINATION:  CT HEAD WITHOUT  CONTRAST    CLINICAL HISTORY:  Dizziness, persistent/recurrent, cardiac or vascular cause suspected; Dizziness and giddiness    TECHNIQUE:  Low dose axial CT images obtained throughout the head without intravenous contrast. Sagittal and coronal reconstructions were performed.    COMPARISON:  None.    FINDINGS:  Atrophy and chronic white matter changes    No parenchymal mass, hemorrhage, edema or major vascular distribution infarct.    Skull/extracranial contents (limited evaluation): No fracture. Mastoid air cells and paranasal sinuses are essentially clear.  Question left sided cataracts.                                       X-Ray Chest 1 View (Final result)  Result time 09/26/23 20:16:17      Final result by Jayden Santos MD (09/26/23 20:16:17)                   Impression:      No acute abnormality.      Electronically signed by: Jayden Santos  Date:    09/26/2023  Time:    20:16               Narrative:    EXAMINATION:  XR CHEST 1 VIEW    CLINICAL HISTORY:  Chest pain, unspecified    TECHNIQUE:  Single frontal view of the chest was performed.    COMPARISON:  None    FINDINGS:  The lungs are clear, with normal appearance of pulmonary vasculature and no pleural effusion or pneumothorax.    The cardiac silhouette is normal in size. The hilar and mediastinal contours are unremarkable.    Bones are intact.                                       Medications   lactated ringers bolus 1,000 mL (1,000 mLs Intravenous New Bag 9/26/23 2115)     Medical Decision Making  Problems Addressed:  Chest pain: acute illness or injury  Chest pain, unspecified: acute illness or injury  Dizziness: acute illness or injury  Orthostasis: acute illness or injury    Amount and/or Complexity of Data Reviewed  Labs: ordered.  Radiology: ordered.  ECG/medicine tests: ordered and independent interpretation performed. Decision-making details documented in ED Course.    Risk  Decision regarding hospitalization.    Pt feels improved after IV Fs  dizziness improved. Discussed need to f/u c Cardiology/ PCP.   10:04 PM - Counseling: Spoke with the patient and discussed todays findings, in addition to providing specific details for the plan of care and counseling regarding the diagnosis and prognosis. Questions are answered at this time. I have discussed with patient and/or family/caretaker chest pain precautions, specifically to return for worsening chest pain, shortness of breath, fever, or any concern.  I have low suspicion for cardiopulmonary, vascular, infectious, respiratory, or other emergent medical condition based on my evaluation in the ED.      Additional MDM:   Heart Score:    History:          Slightly suspicious.  ECG:             Normal  Age:               >65 years  Risk factors: 1-2 risk factors  Troponin:       Less than or equal to normal limit  Heart Score = 3          NIH Stroke Scale:   Interval = baseline (upon arrival/admit)  Level of consciousness = 0 - alert  LOC questions = 0 - answers both correctly  LOC commands = 0 - performs both correctly  Best gaze = 0 - normal  Visual = 0 - no visual loss  Facial palsy = 0 - normal  Motor left arm =  0 - no drift  Motor right arm =  0 - no drift  Motor left leg = 0 - no drift  Motor right leg =  0 - no drift  Limb ataxia = 0 - absent  Sensory = 0 - normal  Best language = 0 - no aphasia  Dysarthria = 0 - normal articulation  Extinction and inattention = 0 - no neglect  NIH Stroke Scale Total = 0                              Clinical Impression:   Final diagnoses:  [R07.9] Chest pain, unspecified  [R07.9] Chest pain  [R42] Dizziness  [I95.1] Orthostasis (Primary)        ED Disposition Condition    Discharge Stable          ED Prescriptions    None       Follow-up Information       Follow up With Specialties Details Why Contact Info    [facility] Cardiology Call in 2 days  [address]  [facility] [address] [phone]    [facility] - Emergency Dept Emergency  Medicine  If symptoms worsen [address] [address]  [place] [id number] [phone]    Sampson Velazquez MD Internal Medicine In 3 days  [address]   [facility] [address]  [phone]               Jeffrey Diaz MD  09/26/23 2204       Jeffrey Diaz MD  09/26/23 2200

## 2023-12-14 ENCOUNTER — HOSPITAL ENCOUNTER (EMERGENCY)
Facility: HOSPITAL | Age: 73
Discharge: HOME | End: 2023-12-14
Attending: EMERGENCY MEDICINE
Payer: MEDICARE

## 2023-12-14 VITALS
BODY MASS INDEX: 27.93 KG/M2 | TEMPERATURE: 98 F | SYSTOLIC BLOOD PRESSURE: 146 MMHG | WEIGHT: 217.63 LBS | HEART RATE: 98 BPM | HEIGHT: 74 IN | OXYGEN SATURATION: 98 % | DIASTOLIC BLOOD PRESSURE: 90 MMHG | RESPIRATION RATE: 20 BRPM

## 2023-12-14 DIAGNOSIS — R10.84 GENERALIZED ABDOMINAL PAIN: Primary | ICD-10-CM

## 2023-12-14 DIAGNOSIS — R19.7 DIARRHEA, UNSPECIFIED TYPE: ICD-10-CM

## 2023-12-14 LAB
ALBUMIN SERPL BCP-MCNC: 4.4 G/DL (ref 3.5–5.2)
ALP SERPL-CCNC: 72 U/L (ref 55–135)
ALT SERPL W/O P-5'-P-CCNC: 14 U/L (ref 10–44)
ANION GAP SERPL CALC-SCNC: 16 MMOL/L (ref 8–16)
AST SERPL-CCNC: 17 U/L (ref 10–40)
BASOPHILS # BLD AUTO: 0.08 K/UL (ref 0–0.2)
BASOPHILS NFR BLD: 0.7 % (ref 0–1.9)
BILIRUB SERPL-MCNC: 0.7 MG/DL (ref 0.1–1)
BUN SERPL-MCNC: 13 MG/DL (ref 8–23)
CALCIUM SERPL-MCNC: 9.1 MG/DL (ref 8.7–10.5)
CHLORIDE SERPL-SCNC: 103 MMOL/L (ref 95–110)
CO2 SERPL-SCNC: 21 MMOL/L (ref 23–29)
CREAT SERPL-MCNC: 1.1 MG/DL (ref 0.5–1.4)
DIFFERENTIAL METHOD: ABNORMAL
EOSINOPHIL # BLD AUTO: 0.1 K/UL (ref 0–0.5)
EOSINOPHIL NFR BLD: 1.2 % (ref 0–8)
ERYTHROCYTE [DISTWIDTH] IN BLOOD BY AUTOMATED COUNT: 13.6 % (ref 11.5–14.5)
EST. GFR  (NO RACE VARIABLE): >60 ML/MIN/1.73 M^2
GLUCOSE SERPL-MCNC: 116 MG/DL (ref 70–110)
HCT VFR BLD AUTO: 50.1 % (ref 40–54)
HGB BLD-MCNC: 16.7 G/DL (ref 14–18)
IMM GRANULOCYTES # BLD AUTO: 0.03 K/UL (ref 0–0.04)
IMM GRANULOCYTES NFR BLD AUTO: 0.2 % (ref 0–0.5)
LIPASE SERPL-CCNC: 19 U/L (ref 4–60)
LYMPHOCYTES # BLD AUTO: 3.5 K/UL (ref 1–4.8)
LYMPHOCYTES NFR BLD: 29.2 % (ref 18–48)
MCH RBC QN AUTO: 30.8 PG (ref 27–31)
MCHC RBC AUTO-ENTMCNC: 33.3 G/DL (ref 32–36)
MCV RBC AUTO: 92 FL (ref 82–98)
MONOCYTES # BLD AUTO: 1.2 K/UL (ref 0.3–1)
MONOCYTES NFR BLD: 9.7 % (ref 4–15)
NEUTROPHILS # BLD AUTO: 7.1 K/UL (ref 1.8–7.7)
NEUTROPHILS NFR BLD: 59 % (ref 38–73)
NRBC BLD-RTO: 0 /100 WBC
PLATELET # BLD AUTO: 293 K/UL (ref 150–450)
PMV BLD AUTO: 9.2 FL (ref 9.2–12.9)
POTASSIUM SERPL-SCNC: 3.4 MMOL/L (ref 3.5–5.1)
PROT SERPL-MCNC: 7.9 G/DL (ref 6–8.4)
RBC # BLD AUTO: 5.42 M/UL (ref 4.6–6.2)
SODIUM SERPL-SCNC: 140 MMOL/L (ref 136–145)
WBC # BLD AUTO: 12.02 K/UL (ref 3.9–12.7)

## 2023-12-14 PROCEDURE — 80053 COMPREHEN METABOLIC PANEL: CPT | Mod: ER | Performed by: EMERGENCY MEDICINE

## 2023-12-14 PROCEDURE — 83690 ASSAY OF LIPASE: CPT | Mod: ER | Performed by: EMERGENCY MEDICINE

## 2023-12-14 PROCEDURE — 25000003 PHARM REV CODE 250: Mod: ER | Performed by: EMERGENCY MEDICINE

## 2023-12-14 PROCEDURE — 96374 THER/PROPH/DIAG INJ IV PUSH: CPT | Mod: ER

## 2023-12-14 PROCEDURE — 96361 HYDRATE IV INFUSION ADD-ON: CPT | Mod: ER

## 2023-12-14 PROCEDURE — 85025 COMPLETE CBC W/AUTO DIFF WBC: CPT | Mod: ER | Performed by: EMERGENCY MEDICINE

## 2023-12-14 PROCEDURE — 99285 EMERGENCY DEPT VISIT HI MDM: CPT | Mod: 25,ER

## 2023-12-14 PROCEDURE — 86803 HEPATITIS C AB TEST: CPT | Performed by: EMERGENCY MEDICINE

## 2023-12-14 PROCEDURE — 63600175 PHARM REV CODE 636 W HCPCS: Mod: ER | Performed by: EMERGENCY MEDICINE

## 2023-12-14 PROCEDURE — 87389 HIV-1 AG W/HIV-1&-2 AB AG IA: CPT | Performed by: EMERGENCY MEDICINE

## 2023-12-14 RX ORDER — DIPHENOXYLATE HYDROCHLORIDE AND ATROPINE SULFATE 2.5; .025 MG/1; MG/1
1 TABLET ORAL 4 TIMES DAILY PRN
Qty: 15 TABLET | Refills: 0 | Status: SHIPPED | OUTPATIENT
Start: 2023-12-14 | End: 2023-12-19

## 2023-12-14 RX ORDER — ONDANSETRON 2 MG/ML
4 INJECTION INTRAMUSCULAR; INTRAVENOUS
Status: COMPLETED | OUTPATIENT
Start: 2023-12-14 | End: 2023-12-14

## 2023-12-14 RX ADMIN — ONDANSETRON 4 MG: 2 INJECTION INTRAMUSCULAR; INTRAVENOUS at 01:12

## 2023-12-14 RX ADMIN — SODIUM CHLORIDE 1000 ML: 9 INJECTION, SOLUTION INTRAVENOUS at 01:12

## 2023-12-14 NOTE — ED PROVIDER NOTES
Encounter Date: 12/14/2023       History     Chief Complaint   Patient presents with    Abdominal Pain     Lower bd pain and diarrhea, onset yesterday, black stool      The history is provided by the patient.   Abdominal Pain  The current episode started yesterday. The onset of the illness was gradual. The abdominal pain is generalized. The other symptoms of the illness include nausea and diarrhea. The other symptoms of the illness do not include fever, shortness of breath, vomiting or dysuria.   Symptoms associated with the illness do not include back pain.     Review of patient's allergies indicates:   Allergen Reactions    Oxycodone hcl-oxycodone-asa     Pcn [penicillins]     Tramadol Itching     Past Medical History:   Diagnosis Date    Arthritis     BPH (benign prostatic hyperplasia)     Cataract     GERD (gastroesophageal reflux disease)     Hyperlipidemia     Hypertension     Kidney stones     Thyroid disease      Past Surgical History:   Procedure Laterality Date    BACK SURGERY      CATARACT EXTRACTION      right eye    KNEE SURGERY      left    THYROIDECTOMY, PARTIAL      TONSILLECTOMY       Family History   Problem Relation Age of Onset    No Known Problems Mother     No Known Problems Father     Diabetes Paternal Grandmother      Social History     Tobacco Use    Smoking status: Every Day     Current packs/day: 1.00     Types: Cigarettes    Smokeless tobacco: Never   Substance Use Topics    Alcohol use: No    Drug use: No     Review of Systems   Constitutional:  Negative for fever.   HENT:  Negative for sore throat.    Respiratory:  Negative for shortness of breath.    Cardiovascular:  Negative for chest pain.   Gastrointestinal:  Positive for abdominal pain, diarrhea and nausea. Negative for vomiting.   Genitourinary:  Negative for dysuria.   Musculoskeletal:  Negative for back pain.   Skin:  Negative for rash.   Neurological:  Negative for weakness.   Hematological:  Does not bruise/bleed easily.        Physical Exam     Initial Vitals [12/14/23 1249]   BP Pulse Resp Temp SpO2   (!) 146/90 (!) 132 20 97.8 °F (36.6 °C) 98 %      MAP       --         Physical Exam    Nursing note and vitals reviewed.  Constitutional: He appears well-developed and well-nourished. No distress.   HENT:   Head: Normocephalic and atraumatic.   Mouth/Throat: Oropharynx is clear and moist.   Eyes: Conjunctivae and EOM are normal. Pupils are equal, round, and reactive to light.   Neck: Neck supple.   Normal range of motion.  Cardiovascular:  Normal rate, regular rhythm and normal heart sounds.     Exam reveals no gallop and no friction rub.       No murmur heard.  Pulmonary/Chest: Breath sounds normal. No respiratory distress. He has no wheezes. He has no rhonchi. He has no rales.   Abdominal: Abdomen is soft. Bowel sounds are normal. He exhibits no distension and no mass. There is no abdominal tenderness. There is no rebound and no guarding.   Musculoskeletal:         General: No edema. Normal range of motion.      Cervical back: Normal range of motion and neck supple.     Neurological: He is alert and oriented to person, place, and time. He has normal strength.   Skin: Skin is warm and dry. No rash noted.   Psychiatric: He has a normal mood and affect. Thought content normal.         ED Course   Procedures  Labs Reviewed   CBC W/ AUTO DIFFERENTIAL - Abnormal; Notable for the following components:       Result Value    Mono # 1.2 (*)     All other components within normal limits    Narrative:     Release to patient->Immediate   COMPREHENSIVE METABOLIC PANEL - Abnormal; Notable for the following components:    Potassium 3.4 (*)     CO2 21 (*)     Glucose 116 (*)     All other components within normal limits    Narrative:     Release to patient->Immediate   LIPASE    Narrative:     Release to patient->Immediate   HIV 1 / 2 ANTIBODY   HEPATITIS C ANTIBODY   HEP C VIRUS HOLD SPECIMEN   URINALYSIS, REFLEX TO URINE CULTURE          Imaging  Results              CT Renal Stone Study ABD Pelvis WO (Final result)  Result time 12/14/23 13:20:55      Final result by Kermit Mera MD (12/14/23 13:20:55)                   Impression:      No obstructive uropathy.    5 mm nonobstructive right lower pole renal calculus.    Fatty liver.    Cholelithiasis.    All CT scans at this facility are performed  using dose modulation techniques as appropriate to performed exam including the following:  automated exposure control; adjustment of mA and/or kV according to the patients size (this includes techniques or standardized protocols for targeted exams where dose is matched to indication/reason for exam: i.e. extremities or head);  iterative reconstruction technique.      Electronically signed by: Kermit Mera MD  Date:    12/14/2023  Time:    13:20               Narrative:    EXAMINATION:  CT RENAL STONE STUDY ABD PELVIS WO    CLINICAL HISTORY:  Flank pain, kidney stone suspected;    TECHNIQUE:  Axial CT images performed through the abdomen and pelvis without intravenous contrast. Multiplanar reformats were performed and interpreted.    COMPARISON:  None    FINDINGS:  Lung bases are clear.  Coronary artery calcifications.    5 mm nonobstructive calculus at the lower pole the right kidney.  No hydronephrosis or ureteral calculi.  Benign left upper pole renal cyst.    Fatty liver.  Cholelithiasis.  The pancreas and adrenal glands are within normal limits.    2.2 cm low-density lesion in the spleen, likely a cyst.    No free fluid, free air, or inflammatory change.    The small bowel is within normal limits.  Colonic diverticulosis.  The appendix is normal.    Aortic atherosclerosis without evidence of aneurysm.    The urinary bladder is unremarkable. Advanced degenerative disc disease throughout the thoracolumbar spine.  Anterior and posterior fusion hardware from L4 through S1.    No significant osseous abnormality is identified.                                        Medications   sodium chloride 0.9% bolus 1,000 mL 1,000 mL (0 mLs Intravenous Stopped 12/14/23 1413)   ondansetron injection 4 mg (4 mg Intravenous Given 12/14/23 1302)     Medical Decision Making  Abdominal pain and diarrhea  DDx: diverticulitis, diarrhea    Amount and/or Complexity of Data Reviewed  Labs: ordered.     Details: No acute findings  Radiology: ordered.     Details: No acute findings  Discussion of management or test interpretation with external provider(s): Feeling better after treatment in the department, and ready to go home.     Risk  Prescription drug management.                                      Clinical Impression:  Final diagnoses:  [R10.84] Generalized abdominal pain (Primary)  [R19.7] Diarrhea, unspecified type          ED Disposition Condition    Discharge Stable          ED Prescriptions       Medication Sig Dispense Start Date End Date Auth. Provider    diphenoxylate-atropine 2.5-0.025 mg (LOMOTIL) 2.5-0.025 mg per tablet Take 1 tablet by mouth 4 (four) times daily as needed for Diarrhea. 15 tablet 12/14/2023 12/19/2023 Reagan Alvarenga MD          Follow-up Information       Follow up With Specialties Details Why Contact Info    Sampson Velazquez MD Internal Medicine   [address]   [place] [facility] [place] [id number] [phone]               Reagan Alvarenga MD  12/14/23 5550

## 2025-07-29 ENCOUNTER — HOSPITAL ENCOUNTER (EMERGENCY)
Facility: HOSPITAL | Age: 75
Discharge: LEFT BEFORE DISCHARGE | End: 2025-07-29
Attending: EMERGENCY MEDICINE | Admitting: SPECIALIST
Payer: MEDICARE

## 2025-07-29 VITALS
HEIGHT: 73 IN | BODY MASS INDEX: 23.03 KG/M2 | RESPIRATION RATE: 17 BRPM | TEMPERATURE: 98 F | SYSTOLIC BLOOD PRESSURE: 160 MMHG | WEIGHT: 173.75 LBS | HEART RATE: 91 BPM | DIASTOLIC BLOOD PRESSURE: 73 MMHG | OXYGEN SATURATION: 99 %

## 2025-07-29 DIAGNOSIS — Z72.0 TOBACCO ABUSE: ICD-10-CM

## 2025-07-29 DIAGNOSIS — Z53.29 LEFT AGAINST MEDICAL ADVICE: ICD-10-CM

## 2025-07-29 DIAGNOSIS — R53.83 FATIGUE: ICD-10-CM

## 2025-07-29 DIAGNOSIS — R59.0 LYMPHADENOPATHY, ABDOMINAL: Primary | ICD-10-CM

## 2025-07-29 LAB
ABSOLUTE EOSINOPHIL (OHS): 0.04 K/UL
ABSOLUTE MONOCYTE (OHS): 1.72 K/UL (ref 0.3–1)
ABSOLUTE NEUTROPHIL COUNT (OHS): 7.76 K/UL (ref 1.8–7.7)
ALBUMIN SERPL BCP-MCNC: 4.7 G/DL (ref 3.5–5.2)
ALP SERPL-CCNC: 65 UNIT/L (ref 40–150)
ALT SERPL W/O P-5'-P-CCNC: <8 UNIT/L (ref 10–44)
ANION GAP (OHS): 11 MMOL/L (ref 8–16)
APTT PPP: 24.7 SECONDS (ref 21–32)
AST SERPL-CCNC: 19 UNIT/L (ref 11–45)
BASOPHILS # BLD AUTO: 0.04 K/UL
BASOPHILS NFR BLD AUTO: 0.3 %
BILIRUB SERPL-MCNC: 0.5 MG/DL (ref 0.1–1)
BILIRUB UR QL STRIP.AUTO: NEGATIVE
BUN SERPL-MCNC: 13 MG/DL (ref 8–23)
CALCIUM SERPL-MCNC: 9.7 MG/DL (ref 8.7–10.5)
CHLORIDE SERPL-SCNC: 103 MMOL/L (ref 95–110)
CLARITY UR: CLEAR
CO2 SERPL-SCNC: 25 MMOL/L (ref 23–29)
COLOR UR AUTO: YELLOW
CREAT SERPL-MCNC: 0.9 MG/DL (ref 0.5–1.4)
ERYTHROCYTE [DISTWIDTH] IN BLOOD BY AUTOMATED COUNT: 14.4 % (ref 11.5–14.5)
GFR SERPLBLD CREATININE-BSD FMLA CKD-EPI: >60 ML/MIN/1.73/M2
GLUCOSE SERPL-MCNC: 110 MG/DL (ref 70–110)
GLUCOSE UR QL STRIP: NEGATIVE
HCT VFR BLD AUTO: 44.2 % (ref 40–54)
HGB BLD-MCNC: 15.1 GM/DL (ref 14–18)
HGB UR QL STRIP: NEGATIVE
IMM GRANULOCYTES # BLD AUTO: 0.05 K/UL (ref 0–0.04)
IMM GRANULOCYTES NFR BLD AUTO: 0.3 % (ref 0–0.5)
INR PPP: 1 (ref 0.8–1.2)
KETONES UR QL STRIP: NEGATIVE
LACTATE SERPL-SCNC: 1 MMOL/L (ref 0.5–2.2)
LDH SERPL-CCNC: 203 U/L (ref 110–260)
LEUKOCYTE ESTERASE UR QL STRIP: NEGATIVE
LIPASE SERPL-CCNC: 11 U/L (ref 4–60)
LYMPHOCYTES # BLD AUTO: 5.14 K/UL (ref 1–4.8)
MAGNESIUM SERPL-MCNC: 2 MG/DL (ref 1.6–2.6)
MCH RBC QN AUTO: 31.6 PG (ref 27–31)
MCHC RBC AUTO-ENTMCNC: 34.2 G/DL (ref 32–36)
MCV RBC AUTO: 93 FL (ref 82–98)
NITRITE UR QL STRIP: NEGATIVE
NUCLEATED RBC (/100WBC) (OHS): 0 /100 WBC
OHS QRS DURATION: 80 MS
OHS QTC CALCULATION: 413 MS
PH UR STRIP: 7 [PH]
PLATELET # BLD AUTO: 260 K/UL (ref 150–450)
PMV BLD AUTO: 9.4 FL (ref 9.2–12.9)
POTASSIUM SERPL-SCNC: 4.4 MMOL/L (ref 3.5–5.1)
PROCALCITONIN SERPL-MCNC: 0.03 NG/ML
PROT SERPL-MCNC: 7.5 GM/DL (ref 6–8.4)
PROT UR QL STRIP: ABNORMAL
PROTHROMBIN TIME: 10.9 SECONDS (ref 9–12.5)
RBC # BLD AUTO: 4.78 M/UL (ref 4.6–6.2)
RELATIVE EOSINOPHIL (OHS): 0.3 %
RELATIVE LYMPHOCYTE (OHS): 34.8 % (ref 18–48)
RELATIVE MONOCYTE (OHS): 11.7 % (ref 4–15)
RELATIVE NEUTROPHIL (OHS): 52.6 % (ref 38–73)
SODIUM SERPL-SCNC: 139 MMOL/L (ref 136–145)
SP GR UR STRIP: <=1.005
T4 FREE SERPL-MCNC: 1.23 NG/DL (ref 0.71–1.51)
TROPONIN I SERPL HS-MCNC: <3 NG/L
TSH SERPL-ACNC: 0.38 UIU/ML (ref 0.4–4)
UROBILINOGEN UR STRIP-ACNC: NEGATIVE EU/DL
WBC # BLD AUTO: 14.75 K/UL (ref 3.9–12.7)

## 2025-07-29 PROCEDURE — 85610 PROTHROMBIN TIME: CPT | Mod: ER | Performed by: EMERGENCY MEDICINE

## 2025-07-29 PROCEDURE — 93010 ELECTROCARDIOGRAM REPORT: CPT | Mod: ,,, | Performed by: INTERNAL MEDICINE

## 2025-07-29 PROCEDURE — 96361 HYDRATE IV INFUSION ADD-ON: CPT | Mod: ER

## 2025-07-29 PROCEDURE — 84145 PROCALCITONIN (PCT): CPT | Mod: ER | Performed by: EMERGENCY MEDICINE

## 2025-07-29 PROCEDURE — 85025 COMPLETE CBC W/AUTO DIFF WBC: CPT | Mod: ER | Performed by: EMERGENCY MEDICINE

## 2025-07-29 PROCEDURE — 93005 ELECTROCARDIOGRAM TRACING: CPT | Mod: ER

## 2025-07-29 PROCEDURE — 86803 HEPATITIS C AB TEST: CPT | Performed by: EMERGENCY MEDICINE

## 2025-07-29 PROCEDURE — 96375 TX/PRO/DX INJ NEW DRUG ADDON: CPT | Mod: ER

## 2025-07-29 PROCEDURE — 84484 ASSAY OF TROPONIN QUANT: CPT | Mod: ER | Performed by: EMERGENCY MEDICINE

## 2025-07-29 PROCEDURE — 83605 ASSAY OF LACTIC ACID: CPT | Mod: ER | Performed by: EMERGENCY MEDICINE

## 2025-07-29 PROCEDURE — 81003 URINALYSIS AUTO W/O SCOPE: CPT | Mod: ER | Performed by: EMERGENCY MEDICINE

## 2025-07-29 PROCEDURE — 87389 HIV-1 AG W/HIV-1&-2 AB AG IA: CPT | Performed by: EMERGENCY MEDICINE

## 2025-07-29 PROCEDURE — 87040 BLOOD CULTURE FOR BACTERIA: CPT | Performed by: EMERGENCY MEDICINE

## 2025-07-29 PROCEDURE — 96365 THER/PROPH/DIAG IV INF INIT: CPT | Mod: ER

## 2025-07-29 PROCEDURE — 84439 ASSAY OF FREE THYROXINE: CPT | Mod: ER | Performed by: EMERGENCY MEDICINE

## 2025-07-29 PROCEDURE — 83615 LACTATE (LD) (LDH) ENZYME: CPT | Mod: ER | Performed by: EMERGENCY MEDICINE

## 2025-07-29 PROCEDURE — 85730 THROMBOPLASTIN TIME PARTIAL: CPT | Mod: ER | Performed by: EMERGENCY MEDICINE

## 2025-07-29 PROCEDURE — 83735 ASSAY OF MAGNESIUM: CPT | Mod: ER | Performed by: EMERGENCY MEDICINE

## 2025-07-29 PROCEDURE — 25500020 PHARM REV CODE 255: Mod: ER | Performed by: EMERGENCY MEDICINE

## 2025-07-29 PROCEDURE — 80053 COMPREHEN METABOLIC PANEL: CPT | Mod: ER | Performed by: EMERGENCY MEDICINE

## 2025-07-29 PROCEDURE — 63600175 PHARM REV CODE 636 W HCPCS: Mod: ER | Performed by: EMERGENCY MEDICINE

## 2025-07-29 PROCEDURE — 99285 EMERGENCY DEPT VISIT HI MDM: CPT | Mod: 25,ER

## 2025-07-29 PROCEDURE — 83690 ASSAY OF LIPASE: CPT | Mod: ER | Performed by: EMERGENCY MEDICINE

## 2025-07-29 RX ORDER — ONDANSETRON 4 MG/1
4 TABLET, ORALLY DISINTEGRATING ORAL EVERY 6 HOURS PRN
Qty: 12 TABLET | Refills: 0 | Status: SHIPPED | OUTPATIENT
Start: 2025-07-29

## 2025-07-29 RX ORDER — IBUPROFEN 200 MG
1 TABLET ORAL
Status: DISCONTINUED | OUTPATIENT
Start: 2025-07-29 | End: 2025-07-29 | Stop reason: HOSPADM

## 2025-07-29 RX ORDER — ONDANSETRON HYDROCHLORIDE 2 MG/ML
4 INJECTION, SOLUTION INTRAVENOUS
Status: COMPLETED | OUTPATIENT
Start: 2025-07-29 | End: 2025-07-29

## 2025-07-29 RX ORDER — MIRTAZAPINE 15 MG/1
45 TABLET, FILM COATED ORAL NIGHTLY
COMMUNITY
Start: 2025-05-07

## 2025-07-29 RX ORDER — FAMOTIDINE 20 MG/50ML
20 INJECTION, SOLUTION INTRAVENOUS
Status: COMPLETED | OUTPATIENT
Start: 2025-07-29 | End: 2025-07-29

## 2025-07-29 RX ORDER — IBUPROFEN 200 MG
1 TABLET ORAL DAILY
Qty: 28 PATCH | Refills: 3 | Status: SHIPPED | OUTPATIENT
Start: 2025-07-29

## 2025-07-29 RX ADMIN — IOHEXOL 100 ML: 350 INJECTION, SOLUTION INTRAVENOUS at 03:07

## 2025-07-29 RX ADMIN — SODIUM CHLORIDE, POTASSIUM CHLORIDE, SODIUM LACTATE AND CALCIUM CHLORIDE 1000 ML: 600; 310; 30; 20 INJECTION, SOLUTION INTRAVENOUS at 02:07

## 2025-07-29 RX ADMIN — FAMOTIDINE 20 MG: 20 INJECTION, SOLUTION INTRAVENOUS at 02:07

## 2025-07-29 RX ADMIN — ONDANSETRON 4 MG: 2 INJECTION INTRAMUSCULAR; INTRAVENOUS at 02:07

## 2025-07-30 LAB
HCV AB SERPL QL IA: NORMAL
HIV 1+2 AB+HIV1 P24 AG SERPL QL IA: NORMAL
HOLD SPECIMEN: NORMAL

## 2025-07-31 ENCOUNTER — HOSPITAL ENCOUNTER (OUTPATIENT)
Facility: HOSPITAL | Age: 75
Discharge: HOME | End: 2025-08-01
Attending: EMERGENCY MEDICINE | Admitting: SPECIALIST
Payer: MEDICARE

## 2025-07-31 DIAGNOSIS — R59.0 ABDOMINAL LYMPHADENOPATHY: Primary | ICD-10-CM

## 2025-07-31 DIAGNOSIS — D72.829 LEUKOCYTOSIS, UNSPECIFIED TYPE: ICD-10-CM

## 2025-07-31 DIAGNOSIS — R10.84 GENERALIZED ABDOMINAL PAIN: ICD-10-CM

## 2025-07-31 DIAGNOSIS — R59.1 LYMPHADENOPATHY: ICD-10-CM

## 2025-07-31 DIAGNOSIS — R07.9 CHEST PAIN: ICD-10-CM

## 2025-07-31 PROBLEM — F17.200 TOBACCO DEPENDENCE: Chronic | Status: ACTIVE | Noted: 2025-07-31

## 2025-07-31 PROBLEM — E03.9 HYPOTHYROIDISM: Chronic | Status: ACTIVE | Noted: 2025-07-31

## 2025-07-31 PROBLEM — K21.9 GERD (GASTROESOPHAGEAL REFLUX DISEASE): Chronic | Status: ACTIVE | Noted: 2025-07-31

## 2025-07-31 PROBLEM — E11.9 TYPE 2 DIABETES MELLITUS: Chronic | Status: ACTIVE | Noted: 2025-07-31

## 2025-07-31 LAB
ABSOLUTE EOSINOPHIL (OHS): 0.06 K/UL
ABSOLUTE MONOCYTE (OHS): 2.01 K/UL (ref 0.3–1)
ABSOLUTE NEUTROPHIL COUNT (OHS): 7.13 K/UL (ref 1.8–7.7)
ALBUMIN SERPL BCP-MCNC: 4.2 G/DL (ref 3.5–5.2)
ALP SERPL-CCNC: 60 UNIT/L (ref 40–150)
ALT SERPL W/O P-5'-P-CCNC: 8 UNIT/L (ref 10–44)
ANION GAP (OHS): 7 MMOL/L (ref 8–16)
AST SERPL-CCNC: 30 UNIT/L (ref 11–45)
BASOPHILS # BLD AUTO: 0.05 K/UL
BASOPHILS NFR BLD AUTO: 0.3 %
BILIRUB SERPL-MCNC: 0.5 MG/DL (ref 0.1–1)
BILIRUB UR QL STRIP.AUTO: NEGATIVE
BUN SERPL-MCNC: 13 MG/DL (ref 8–23)
CALCIUM SERPL-MCNC: 8.9 MG/DL (ref 8.7–10.5)
CHLORIDE SERPL-SCNC: 105 MMOL/L (ref 95–110)
CLARITY UR: CLEAR
CO2 SERPL-SCNC: 25 MMOL/L (ref 23–29)
COLOR UR AUTO: YELLOW
CREAT SERPL-MCNC: 1 MG/DL (ref 0.5–1.4)
ERYTHROCYTE [DISTWIDTH] IN BLOOD BY AUTOMATED COUNT: 14.2 % (ref 11.5–14.5)
GFR SERPLBLD CREATININE-BSD FMLA CKD-EPI: >60 ML/MIN/1.73/M2
GLUCOSE SERPL-MCNC: 148 MG/DL (ref 70–110)
GLUCOSE UR QL STRIP: NEGATIVE
HCT VFR BLD AUTO: 40.2 % (ref 40–54)
HGB BLD-MCNC: 13.7 GM/DL (ref 14–18)
HGB UR QL STRIP: ABNORMAL
IMM GRANULOCYTES # BLD AUTO: 0.07 K/UL (ref 0–0.04)
IMM GRANULOCYTES NFR BLD AUTO: 0.5 % (ref 0–0.5)
KETONES UR QL STRIP: NEGATIVE
LACTATE SERPL-SCNC: 1.1 MMOL/L (ref 0.5–2.2)
LEUKOCYTE ESTERASE UR QL STRIP: NEGATIVE
LIPASE SERPL-CCNC: 16 U/L (ref 4–60)
LYMPHOCYTES # BLD AUTO: 6.06 K/UL (ref 1–4.8)
MCH RBC QN AUTO: 31.4 PG (ref 27–31)
MCHC RBC AUTO-ENTMCNC: 34.1 G/DL (ref 32–36)
MCV RBC AUTO: 92 FL (ref 82–98)
NITRITE UR QL STRIP: NEGATIVE
NUCLEATED RBC (/100WBC) (OHS): 0 /100 WBC
PH UR STRIP: 8 [PH]
PLATELET # BLD AUTO: 247 K/UL (ref 150–450)
PMV BLD AUTO: 9.3 FL (ref 9.2–12.9)
POCT GLUCOSE: 120 MG/DL (ref 70–110)
POTASSIUM SERPL-SCNC: 4.3 MMOL/L (ref 3.5–5.1)
PROT SERPL-MCNC: 6.6 GM/DL (ref 6–8.4)
PROT UR QL STRIP: NEGATIVE
RBC # BLD AUTO: 4.36 M/UL (ref 4.6–6.2)
RELATIVE EOSINOPHIL (OHS): 0.4 %
RELATIVE LYMPHOCYTE (OHS): 39.4 % (ref 18–48)
RELATIVE MONOCYTE (OHS): 13.1 % (ref 4–15)
RELATIVE NEUTROPHIL (OHS): 46.3 % (ref 38–73)
SODIUM SERPL-SCNC: 137 MMOL/L (ref 136–145)
SP GR UR STRIP: 1.01
UROBILINOGEN UR STRIP-ACNC: NEGATIVE EU/DL
WBC # BLD AUTO: 15.38 K/UL (ref 3.9–12.7)

## 2025-07-31 PROCEDURE — 83605 ASSAY OF LACTIC ACID: CPT | Performed by: INTERNAL MEDICINE

## 2025-07-31 PROCEDURE — 25500020 PHARM REV CODE 255: Mod: ER | Performed by: EMERGENCY MEDICINE

## 2025-07-31 PROCEDURE — 25000003 PHARM REV CODE 250: Performed by: INTERNAL MEDICINE

## 2025-07-31 PROCEDURE — G0378 HOSPITAL OBSERVATION PER HR: HCPCS

## 2025-07-31 PROCEDURE — 83690 ASSAY OF LIPASE: CPT | Mod: ER | Performed by: INTERNAL MEDICINE

## 2025-07-31 PROCEDURE — 85025 COMPLETE CBC W/AUTO DIFF WBC: CPT | Mod: ER | Performed by: EMERGENCY MEDICINE

## 2025-07-31 PROCEDURE — 99285 EMERGENCY DEPT VISIT HI MDM: CPT | Mod: 25,ER

## 2025-07-31 PROCEDURE — G0378 HOSPITAL OBSERVATION PER HR: HCPCS | Mod: ER

## 2025-07-31 PROCEDURE — 80053 COMPREHEN METABOLIC PANEL: CPT | Mod: ER | Performed by: EMERGENCY MEDICINE

## 2025-07-31 PROCEDURE — 36415 COLL VENOUS BLD VENIPUNCTURE: CPT | Performed by: INTERNAL MEDICINE

## 2025-07-31 PROCEDURE — S4991 NICOTINE PATCH NONLEGEND: HCPCS | Performed by: INTERNAL MEDICINE

## 2025-07-31 PROCEDURE — 81003 URINALYSIS AUTO W/O SCOPE: CPT | Mod: ER | Performed by: EMERGENCY MEDICINE

## 2025-07-31 RX ORDER — KETOROLAC TROMETHAMINE 30 MG/ML
15 INJECTION, SOLUTION INTRAMUSCULAR; INTRAVENOUS EVERY 6 HOURS PRN
Status: DISCONTINUED | OUTPATIENT
Start: 2025-07-31 | End: 2025-08-01

## 2025-07-31 RX ORDER — ACETAMINOPHEN 325 MG/1
650 TABLET ORAL EVERY 4 HOURS PRN
Status: DISCONTINUED | OUTPATIENT
Start: 2025-07-31 | End: 2025-08-01 | Stop reason: HOSPADM

## 2025-07-31 RX ORDER — ALLOPURINOL 300 MG/1
300 TABLET ORAL DAILY
Status: DISCONTINUED | OUTPATIENT
Start: 2025-08-01 | End: 2025-08-01 | Stop reason: HOSPADM

## 2025-07-31 RX ORDER — GABAPENTIN 300 MG/1
600 CAPSULE ORAL 2 TIMES DAILY
Status: DISCONTINUED | OUTPATIENT
Start: 2025-07-31 | End: 2025-08-01 | Stop reason: HOSPADM

## 2025-07-31 RX ORDER — GLUCAGON 1 MG
1 KIT INJECTION
Status: DISCONTINUED | OUTPATIENT
Start: 2025-07-31 | End: 2025-08-01 | Stop reason: HOSPADM

## 2025-07-31 RX ORDER — SODIUM CHLORIDE 0.9 % (FLUSH) 0.9 %
10 SYRINGE (ML) INJECTION EVERY 12 HOURS PRN
Status: DISCONTINUED | OUTPATIENT
Start: 2025-07-31 | End: 2025-08-01 | Stop reason: HOSPADM

## 2025-07-31 RX ORDER — AMOXICILLIN 250 MG
1 CAPSULE ORAL 2 TIMES DAILY
Status: DISCONTINUED | OUTPATIENT
Start: 2025-07-31 | End: 2025-08-01 | Stop reason: HOSPADM

## 2025-07-31 RX ORDER — GABAPENTIN 600 MG/1
600 TABLET ORAL 2 TIMES DAILY
COMMUNITY
Start: 2025-05-01

## 2025-07-31 RX ORDER — IBUPROFEN 200 MG
1 TABLET ORAL DAILY
Qty: 28 PATCH | Refills: 3 | Status: SHIPPED | OUTPATIENT
Start: 2025-07-31

## 2025-07-31 RX ORDER — LEVOTHYROXINE SODIUM 100 UG/1
100 TABLET ORAL DAILY
Status: DISCONTINUED | OUTPATIENT
Start: 2025-08-01 | End: 2025-08-01 | Stop reason: HOSPADM

## 2025-07-31 RX ORDER — PROCHLORPERAZINE EDISYLATE 5 MG/ML
5 INJECTION INTRAMUSCULAR; INTRAVENOUS EVERY 6 HOURS PRN
Status: DISCONTINUED | OUTPATIENT
Start: 2025-07-31 | End: 2025-08-01 | Stop reason: HOSPADM

## 2025-07-31 RX ORDER — POLYETHYLENE GLYCOL 3350 17 G/17G
17 POWDER, FOR SOLUTION ORAL DAILY PRN
Status: DISCONTINUED | OUTPATIENT
Start: 2025-07-31 | End: 2025-08-01 | Stop reason: HOSPADM

## 2025-07-31 RX ORDER — IBUPROFEN 200 MG
24 TABLET ORAL
Status: DISCONTINUED | OUTPATIENT
Start: 2025-07-31 | End: 2025-08-01 | Stop reason: HOSPADM

## 2025-07-31 RX ORDER — IBUPROFEN 200 MG
1 TABLET ORAL DAILY
Status: DISCONTINUED | OUTPATIENT
Start: 2025-07-31 | End: 2025-08-01 | Stop reason: HOSPADM

## 2025-07-31 RX ORDER — ONDANSETRON HYDROCHLORIDE 2 MG/ML
4 INJECTION, SOLUTION INTRAVENOUS EVERY 8 HOURS PRN
Status: DISCONTINUED | OUTPATIENT
Start: 2025-07-31 | End: 2025-08-01 | Stop reason: HOSPADM

## 2025-07-31 RX ORDER — HYDRALAZINE HYDROCHLORIDE 20 MG/ML
10 INJECTION INTRAMUSCULAR; INTRAVENOUS EVERY 6 HOURS PRN
Status: DISCONTINUED | OUTPATIENT
Start: 2025-07-31 | End: 2025-08-01 | Stop reason: HOSPADM

## 2025-07-31 RX ORDER — PANTOPRAZOLE SODIUM 40 MG/1
40 TABLET, DELAYED RELEASE ORAL DAILY
Status: DISCONTINUED | OUTPATIENT
Start: 2025-08-01 | End: 2025-08-01 | Stop reason: HOSPADM

## 2025-07-31 RX ORDER — TALC
6 POWDER (GRAM) TOPICAL NIGHTLY PRN
Status: DISCONTINUED | OUTPATIENT
Start: 2025-07-31 | End: 2025-08-01 | Stop reason: HOSPADM

## 2025-07-31 RX ORDER — MIRTAZAPINE 15 MG/1
45 TABLET, FILM COATED ORAL NIGHTLY
Status: DISCONTINUED | OUTPATIENT
Start: 2025-07-31 | End: 2025-08-01 | Stop reason: HOSPADM

## 2025-07-31 RX ORDER — IBUPROFEN 200 MG
16 TABLET ORAL
Status: DISCONTINUED | OUTPATIENT
Start: 2025-07-31 | End: 2025-08-01 | Stop reason: HOSPADM

## 2025-07-31 RX ORDER — ATORVASTATIN CALCIUM 40 MG/1
80 TABLET, FILM COATED ORAL DAILY
Status: DISCONTINUED | OUTPATIENT
Start: 2025-08-01 | End: 2025-08-01 | Stop reason: HOSPADM

## 2025-07-31 RX ORDER — NALOXONE HCL 0.4 MG/ML
0.02 VIAL (ML) INJECTION
Status: DISCONTINUED | OUTPATIENT
Start: 2025-07-31 | End: 2025-08-01 | Stop reason: HOSPADM

## 2025-07-31 RX ORDER — INSULIN ASPART 100 [IU]/ML
0-5 INJECTION, SOLUTION INTRAVENOUS; SUBCUTANEOUS
Status: DISCONTINUED | OUTPATIENT
Start: 2025-07-31 | End: 2025-08-01 | Stop reason: HOSPADM

## 2025-07-31 RX ADMIN — MIRTAZAPINE 45 MG: 15 TABLET, FILM COATED ORAL at 08:07

## 2025-07-31 RX ADMIN — NICOTINE 1 PATCH: 21 PATCH, EXTENDED RELEASE TRANSDERMAL at 05:07

## 2025-07-31 RX ADMIN — SENNOSIDES AND DOCUSATE SODIUM 1 TABLET: 50; 8.6 TABLET ORAL at 08:07

## 2025-07-31 RX ADMIN — GABAPENTIN 600 MG: 300 CAPSULE ORAL at 08:07

## 2025-07-31 RX ADMIN — IOHEXOL 100 ML: 350 INJECTION, SOLUTION INTRAVENOUS at 01:07

## 2025-07-31 NOTE — HPI
Patient is a 74-year-old  male with past medical history notable for type 2 diabetes mellitus, hypothyroidism, GERD, gastritis , tobacco dependence who presents as a transfer from a [facility] ED for evaluation of abdominal pain and lymphadenopathy.  Patient seen and examined upon arrival to [facility], brother at bedside.  Patient reports that he had back surgery approximately 2 and half years ago and has been having GI issues, generalized abdominal pain, decreased appetite and lactose intolerance since then.  Reports that pain is intermittent, generalized, described as soreness, worse with eating acidic foods, improved with drinking ginger ale.  Reports decreased appetite over the last 2 and half years as well, associated with 50 lb unintentional weight loss.  Has been seen by GI Dr. Adkins at [facility]. Per review of outside GI notes had C scope in 02/2022 which showed small transverse colon polyp that was removed, diverticulosis in transverse/left colon and hemorrhoids. Path of polyp c/w tubular adenoma. Had EGD in 10/2024 which showed chronic gastritis and hyperplastic polyp in stomach s/p polypectomy.     He reports no improvement in appetite with Remeron 45 mg qhs .  Denies fevers, chills, night sweats, nausea/vomiting/diarrhea, dysuria.  Denies personal history of malignancy.  Patient was initially seen in the ED on 07/29/2025 at which time CTA of the abdomen showed moderate atherosclerosis of the abdominal aorta, negative for mesenteric ischemia but did show retroperitoneal and mesenteric lymphadenopathy.  Patient was offered admission but left AMA.  Returned to the ED on 07/31 for evaluation.  Workup today:  WBC 15, hemoglobin 13, platelets 247, CMP fairly unremarkable, lipase within normal limits.  Had a procalcitonin lactate done 2 days prior which were within normal limits.  UA negative for UTI.  CTA of the chest showed no acute findings.

## 2025-07-31 NOTE — ED PROVIDER NOTES
Encounter Date: 7/31/2025       History     Chief Complaint   Patient presents with    Abdominal Pain     Was seen in er 07/29. Had refused to be admitted that day. Back with his brother today for admittance. Still having abd pain.      Was seen here two days for abdominal pain.  Was supposed to be admitted, but signed out AMA.  Has returned for admission.      The history is provided by the patient.     Review of patient's allergies indicates:   Allergen Reactions    Oxycodone hcl-oxycodone-asa      Just does not like it because it is an opiod    Pcn [penicillins]     Tramadol Itching     Past Medical History:   Diagnosis Date    Arthritis     BPH (benign prostatic hyperplasia)     Cataract     GERD (gastroesophageal reflux disease)     Hyperlipidemia     Hypertension     Kidney stones     Thyroid disease      Past Surgical History:   Procedure Laterality Date    BACK SURGERY      CATARACT EXTRACTION      right eye    KNEE SURGERY      left    THYROIDECTOMY, PARTIAL      TONSILLECTOMY       Family History   Problem Relation Name Age of Onset    No Known Problems Mother      No Known Problems Father      Diabetes Paternal Grandmother       Social History[1]  Review of Systems   Constitutional:  Negative for chills, fatigue and fever.   HENT:  Negative for congestion.    Respiratory:  Negative for cough and shortness of breath.    Gastrointestinal:  Negative for diarrhea, nausea and vomiting.   Genitourinary:  Negative for dysuria.   Neurological:  Negative for weakness and numbness.       Physical Exam     Initial Vitals [07/31/25 1128]   BP Pulse Resp Temp SpO2   (!) 148/74 (!) 124 16 98.3 °F (36.8 °C) 96 %      MAP       --         Physical Exam    Constitutional: He appears well-developed and well-nourished. No distress.   HENT:   Head: Normocephalic and atraumatic.   Eyes: Conjunctivae are normal. Pupils are equal, round, and reactive to light.   Neck: Neck supple.   Normal range of motion.  Cardiovascular:   Normal rate, regular rhythm and normal heart sounds.           Pulmonary/Chest: Breath sounds normal.   Abdominal: Abdomen is soft. Bowel sounds are normal. There is generalized abdominal tenderness.   Musculoskeletal:         General: Normal range of motion.      Cervical back: Normal range of motion and neck supple.     Neurological: He is alert and oriented to person, place, and time. No cranial nerve deficit.   Skin: Skin is warm and dry.   Psychiatric: He has a normal mood and affect.         ED Course   Procedures  Labs Reviewed   COMPREHENSIVE METABOLIC PANEL - Abnormal       Result Value    Sodium 137      Potassium 4.3      Chloride 105      CO2 25      Glucose 148 (*)     BUN 13      Creatinine 1.0      Calcium 8.9      Protein Total 6.6      Albumin 4.2      Bilirubin Total 0.5      ALP 60      AST 30      ALT 8 (*)     Anion Gap 7 (*)     eGFR >60     URINALYSIS, REFLEX TO URINE CULTURE - Abnormal    Color, UA Yellow      Appearance, UA Clear      pH, UA 8.0      Spec Grav UA 1.010      Protein, UA Negative      Glucose, UA Negative      Ketones, UA Negative      Bilirubin, UA Negative      Blood, UA Trace (*)     Nitrites, UA Negative      Urobilinogen, UA Negative      Leukocyte Esterase, UA Negative     CBC WITH DIFFERENTIAL - Abnormal    WBC 15.38 (*)     RBC 4.36 (*)     HGB 13.7 (*)     HCT 40.2      MCV 92      MCH 31.4 (*)     MCHC 34.1      RDW 14.2      Platelet Count 247      MPV 9.3      Nucleated RBC 0      Neut % 46.3      Lymph % 39.4      Mono % 13.1      Eos % 0.4      Basophil % 0.3      Imm Grans % 0.5      Neut # 7.13      Lymph # 6.06 (*)     Mono # 2.01 (*)     Eos # 0.06      Baso # 0.05      Imm Grans # 0.07 (*)     Narrative:     This is an appended report.  These results have been appended to a previously verified report.   CBC W/ AUTO DIFFERENTIAL    Narrative:     The following orders were created for panel order CBC Auto Differential.  Procedure                                Abnormality         Status                     ---------                               -----------         ------                     CBC with Differential[[id number]]       Abnormal            Final result                 Please view results for these tests on the individual orders.   GREY TOP URINE HOLD          Imaging Results              CTA Chest Non-Coronary (PE Studies) (Final result)  Result time 07/31/25 13:23:05      Final result by Dov Baron MD (07/31/25 13:23:05)                   Impression:      No CT evidence of pulmonary embolism. No acute findings.    All CT scans at this facility are performed  using dose modulation techniques as appropriate to performed exam including the following:  automated exposure control; adjustment of mA and/or kV according to the patients size (this includes techniques or standardized protocols for targeted exams where dose is matched to indication/reason for exam: i.e. extremities or head);  iterative reconstruction technique.      Electronically signed by: Dov Baron  Date:    07/31/2025  Time:    13:23               Narrative:    EXAMINATION:  CTA CHEST NON CORONARY (PE STUDIES)    CLINICAL HISTORY:  PE suspected, intermediate prob, neg D-dimer;    TECHNIQUE:  Postcontrast CT angiogram performed of the chest with multiplanar 3D MIP reformations.    FINDINGS:  Calcified granuloma in the right lower lobe abutting the pleural surface.  Lung fields are otherwise clear.  No pleural fluid or pneumothorax is identified.  No CT evidence of pulmonary embolism or aortic dissection is identified.  No enlarged lymph nodes.  No acute bony abnormality is appreciated.                                       Medications   iohexoL (OMNIPAQUE 350) injection 100 mL (100 mLs Intravenous Given 7/31/25 1313)     Medical Decision Making  DDx: abd pain, lymphadenopathy    Amount and/or Complexity of Data Reviewed  Labs: ordered.  Radiology: ordered.  Discussion of management or test  interpretation with external provider(s): Discussed case with Starr Hamlin () Dr. Jernigan will place in observation    Risk  OTC drugs.  Prescription drug management.                                          Clinical Impression:  Final diagnoses:  [R10.84] Generalized abdominal pain (Primary)  [R59.1] Lymphadenopathy          ED Disposition Condition    Observation                       [1]   Social History  Tobacco Use    Smoking status: Every Day     Current packs/day: 1.00     Types: Cigarettes    Smokeless tobacco: Never   Substance Use Topics    Alcohol use: No    Drug use: No        Reagan Alvarenga MD  07/31/25 3025

## 2025-07-31 NOTE — ASSESSMENT & PLAN NOTE
- hx gastritis, takes prilosec BID, followed by [facility] GI Dr. Adkins  - Continue BID PPI

## 2025-07-31 NOTE — ASSESSMENT & PLAN NOTE
"Patient's most recent hemoglobin A1C and blood glucose results are listed below.  A1C  Recent Labs   Lab 08/27/24  1013 10/02/24  1027 04/07/25  0711   Hemoglobin A1C 5.5 5.6 5.6      Fingerstick glucose  No results for input(s): "POCTGLUCOSE" in the last 72 hours.   Inpatient insulin regimen  insulin aspart U-100 pen 0-5 Units, Before meals & nightly PRN, Subcutaneous    Plan  - Hold oral hypoglycemics while patient is in the hospital  - Current inpatient insulin regimen is listed above  - Patient's glucose is controlled  - A1c with AM labs   "

## 2025-07-31 NOTE — ASSESSMENT & PLAN NOTE
- leukocytosis without left shift, no overt s/s of infection. CTA abd and CTA chest with no acute infectious etiology identified. UA neg for UTI   - Hematology/Oncology consult  - monitor CBC

## 2025-07-31 NOTE — ASSESSMENT & PLAN NOTE
Reports smoking 2ppd for many years   Dangers of cigarette smoking were reviewed with patient in detail. Patient was Counseled for 3-10 minutes. Nicotine replacement options were discussed. Nicotine replacement was discussed- prescribed

## 2025-07-31 NOTE — ASSESSMENT & PLAN NOTE
- Pt reports 2.5 year hx of generalized abdominal pain, decreased appetite, 50 lb unintentional weight loss. Differential for TAMMY includes possible malignancy vs. Lymphoma vs occult infectious process. Pt denies personal hx of malignancy but does have significant smoking hx.   - Per review of outside GI notes had C scope in 02/2022 which showed small transverse colon polyp that was removed, diverticulosis in transverse/left colon and hemorrhoids. Path of polyp c/w tubular adenoma. Had EGD in 10/2024 which showed chronic gastritis and hyperplastic polyp in stomach s/p polypectomy.  - Consult Heme Onc for eval   - NPO at [facility] in case biopsy is warranted

## 2025-07-31 NOTE — H&P
[facility] Medicine  History & Physical    Patient Name: Rico Richards  MRN: [id number]  Patient Class: OP- Observation  Admission Date: 7/31/2025  Attending Physician: Dipika Toney MD  Primary Care Provider: Sampson Velazquez MD         Patient information was obtained from patient, relative(s), past medical records, and ER records.     Subjective:     Principal Problem:Abdominal lymphadenopathy    Chief Complaint:   Chief Complaint   Patient presents with    Abdominal Pain     Was seen in er 07/29. Had refused to be admitted that day. Back with his brother today for admittance. Still having abd pain.         HPI: Patient is a 74-year-old  male with past medical history notable for type 2 diabetes mellitus, hypothyroidism, GERD, gastritis , tobacco dependence who presents as a transfer from a [facility] ED for evaluation of abdominal pain and lymphadenopathy.  Patient seen and examined upon arrival to [facility], brother at bedside.  Patient reports that he had back surgery approximately 2 and half years ago and has been having GI issues, generalized abdominal pain, decreased appetite and lactose intolerance since then.  Reports that pain is intermittent, generalized, described as soreness, worse with eating acidic foods, improved with drinking ginger ale.  Reports decreased appetite over the last 2 and half years as well, associated with 50 lb unintentional weight loss.  Has been seen by GI Dr. Adkins at [facility]. Per review of outside GI notes had C scope in 02/2022 which showed small transverse colon polyp that was removed, diverticulosis in transverse/left colon and hemorrhoids. Path of polyp c/w tubular adenoma. Had EGD in 10/2024 which showed chronic gastritis and hyperplastic polyp in stomach s/p polypectomy.     He reports no improvement in appetite with Remeron 45 mg qhs .  Denies fevers, chills, night sweats, nausea/vomiting/diarrhea, dysuria.  Denies  personal history of malignancy.  Patient was initially seen in the ED on 07/29/2025 at which time CTA of the abdomen showed moderate atherosclerosis of the abdominal aorta, negative for mesenteric ischemia but did show retroperitoneal and mesenteric lymphadenopathy.  Patient was offered admission but left AMA.  Returned to the ED on 07/31 for evaluation.  Workup today:  WBC 15, hemoglobin 13, platelets 247, CMP fairly unremarkable, lipase within normal limits.  Had a procalcitonin lactate done 2 days prior which were within normal limits.  UA negative for UTI.  CTA of the chest showed no acute findings.    Past Medical History:   Diagnosis Date    Arthritis     BPH (benign prostatic hyperplasia)     Cataract     GERD (gastroesophageal reflux disease)     Hyperlipidemia     Hypertension     Kidney stones     Thyroid disease        Past Surgical History:   Procedure Laterality Date    BACK SURGERY      CATARACT EXTRACTION      right eye    KNEE SURGERY      left    THYROIDECTOMY, PARTIAL      TONSILLECTOMY         Review of patient's allergies indicates:   Allergen Reactions    Oxycodone hcl-oxycodone-asa      Just does not like it because it is an opiod    Pcn [penicillins]     Tramadol Itching       No current facility-administered medications on file prior to encounter.     Current Outpatient Medications on File Prior to Encounter   Medication Sig    gabapentin (NEURONTIN) 600 MG tablet Take 600 mg by mouth 2 (two) times daily.    allopurinol (ZYLOPRIM) 300 MG tablet Take 300 mg by mouth once daily.    gabapentin (NEURONTIN) 300 MG capsule Take 300 mg by mouth 3 (three) times daily.    HYDROcodone-acetaminophen (NORCO) 5-325 mg per tablet Take 1 tablet by mouth every 6 (six) hours as needed.    levothyroxine (SYNTHROID) 100 MCG tablet Take 100 mcg by mouth once daily.    lisinopril 10 MG tablet Take 10 mg by mouth once daily.    metFORMIN (GLUCOPHAGE-XR) 500 MG ER 24hr tablet Take 500 mg by mouth once daily.     metoprolol tartrate (LOPRESSOR) 25 MG tablet Take 25 mg by mouth 2 (two) times daily.    mirtazapine (REMERON) 15 MG tablet Take 45 mg by mouth every evening. Total 45 min at night 2 sep tabs    MOVANTIK 25 mg tablet Take 25 mg by mouth once daily.    nicotine (NICODERM CQ) 21 mg/24 hr Place 1 patch onto the skin once daily.    omeprazole (PRILOSEC) 40 MG capsule Take 40 mg by mouth once daily.    ondansetron (ZOFRAN-ODT) 4 MG TbDL Take 1 tablet (4 mg total) by mouth every 6 (six) hours as needed.    oxybutynin (DITROPAN) 5 MG Tab Take 1 tablet (5 mg total) by mouth 2 (two) times daily. for 3 days    rosuvastatin (CRESTOR) 40 MG Tab Take 40 mg by mouth.    simvastatin (ZOCOR) 40 MG tablet Take 40 mg by mouth every evening.    tamsulosin (FLOMAX) 0.4 mg Cp24 Take 0.4 mg by mouth once daily.    vardenafil (LEVITRA) 20 MG tablet Take 20 mg by mouth as needed.      Family History       Problem Relation (Age of Onset)    Diabetes Paternal Grandmother    No Known Problems Mother, Father          Tobacco Use    Smoking status: Every Day     Current packs/day: 1.00     Types: Cigarettes    Smokeless tobacco: Never   Substance and Sexual Activity    Alcohol use: No    Drug use: No    Sexual activity: Not on file     Review of Systems   Constitutional:  Positive for appetite change and unexpected weight change. Negative for activity change, chills, fatigue and fever.   HENT:  Negative for congestion, rhinorrhea and sore throat.    Respiratory:  Negative for cough, chest tightness, shortness of breath and wheezing.    Cardiovascular:  Negative for chest pain, palpitations and leg swelling.   Gastrointestinal:  Positive for abdominal pain. Negative for anal bleeding, blood in stool, constipation, diarrhea, nausea and vomiting.   Endocrine: Negative for polyuria.   Genitourinary:  Negative for decreased urine volume, difficulty urinating, dysuria, flank pain and hematuria.   Musculoskeletal:  Negative for arthralgias.   Skin:   Negative for rash and wound.   Neurological:  Negative for dizziness, light-headedness and headaches.   Hematological:  Does not bruise/bleed easily.     Objective:     Vital Signs (Most Recent):  Temp: 98 °F (36.7 °C) (07/31/25 1717)  Pulse: 79 (07/31/25 1718)  Resp: 18 (07/31/25 1717)  BP: (!) 164/90 (07/31/25 1717)  SpO2: 96 % (07/31/25 1717) Vital Signs (24h Range):  Temp:  [98 °F (36.7 °C)-98.3 °F (36.8 °C)] 98 °F (36.7 °C)  Pulse:  [] 79  Resp:  [16-18] 18  SpO2:  [96 %-99 %] 96 %  BP: (123-176)/(74-90) 164/90     Weight: 78.8 kg (173 lb 11.6 oz)  Body mass index is 22.92 kg/m².     Physical Exam  Vitals and nursing note reviewed.   Constitutional:       General: He is not in acute distress.     Appearance: He is not ill-appearing.   HENT:      Head: Normocephalic and atraumatic.      Mouth/Throat:      Mouth: Mucous membranes are moist.      Pharynx: Oropharynx is clear.   Eyes:      Extraocular Movements: Extraocular movements intact.      Conjunctiva/sclera: Conjunctivae normal.   Cardiovascular:      Rate and Rhythm: Normal rate and regular rhythm.      Heart sounds: No murmur heard.  Pulmonary:      Effort: Pulmonary effort is normal.      Breath sounds: Normal breath sounds. No wheezing, rhonchi or rales.   Abdominal:      General: Bowel sounds are normal. There is no distension.      Tenderness: There is no abdominal tenderness. There is no right CVA tenderness, guarding or rebound.   Genitourinary:     Comments: Deferred  Musculoskeletal:      Right lower leg: No edema.      Left lower leg: No edema.   Skin:     General: Skin is warm and dry.      Coloration: Skin is not jaundiced.      Findings: No bruising, lesion or rash.   Neurological:      General: No focal deficit present.      Mental Status: He is alert and oriented to person, place, and time.                Significant Labs: All pertinent labs within the past 24 hours have been reviewed.    Significant Imaging: I have reviewed all  "pertinent imaging results/findings within the past 24 hours.  Assessment/Plan:     Assessment & Plan  Abdominal lymphadenopathy  - Pt reports 2.5 year hx of generalized abdominal pain, decreased appetite, 50 lb unintentional weight loss. Differential for TAMMY includes possible malignancy vs. Lymphoma vs occult infectious process. Pt denies personal hx of malignancy but does have significant smoking hx.   - Per review of outside GI notes had C scope in 02/2022 which showed small transverse colon polyp that was removed, diverticulosis in transverse/left colon and hemorrhoids. Path of polyp c/w tubular adenoma. Had EGD in 10/2024 which showed chronic gastritis and hyperplastic polyp in stomach s/p polypectomy.  - Consult Heme Onc for eval   - NPO at [facility] in case biopsy is warranted     Leukocytosis  - leukocytosis without left shift, no overt s/s of infection. CTA abd and CTA chest with no acute infectious etiology identified. UA neg for UTI   - Hematology/Oncology consult  - monitor CBC     Type 2 diabetes mellitus  Patient's most recent hemoglobin A1C and blood glucose results are listed below.  A1C  Recent Labs   Lab 08/27/24  1013 10/02/24  1027 04/07/25  0711   Hemoglobin A1C 5.5 5.6 5.6      Fingerstick glucose  No results for input(s): "POCTGLUCOSE" in the last 72 hours.   Inpatient insulin regimen  insulin aspart U-100 pen 0-5 Units, Before meals & nightly PRN, Subcutaneous    Plan  - Hold oral hypoglycemics while patient is in the hospital  - Current inpatient insulin regimen is listed above  - Patient's glucose is controlled  - A1c with AM labs   Hypothyroidism  TSH just below lower limit of normal at 0.384, FT4 wnl   Continue home levothyroxine     Tobacco dependence  Reports smoking 2ppd for many years   Dangers of cigarette smoking were reviewed with patient in detail. Patient was Counseled for 3-10 minutes. Nicotine replacement options were discussed. Nicotine replacement was discussed- prescribed  GERD " (gastroesophageal reflux disease)  - hx gastritis, takes prilosec BID, followed by [facility] GI Dr. Adkins  - Continue BID PPI     VTE Risk Mitigation (From admission, onward)           Ordered     IP VTE HIGH RISK PATIENT  Once         07/31/25 1657     Place sequential compression device  Until discontinued         07/31/25 1657     Reason for No Pharmacological VTE Prophylaxis  Once        Question:  Reasons:  Answer:  Physician Provided (leave comment)  Comment:  possible procedure    07/31/25 1657                  On 07/31/2025, patient should be placed in hospital observation services under my care.             Dipika Toney MD  Department of Hospital Medicine  [facility] Tele[facility] ([facility])

## 2025-07-31 NOTE — SUBJECTIVE & OBJECTIVE
Past Medical History:   Diagnosis Date    Arthritis     BPH (benign prostatic hyperplasia)     Cataract     GERD (gastroesophageal reflux disease)     Hyperlipidemia     Hypertension     Kidney stones     Thyroid disease        Past Surgical History:   Procedure Laterality Date    BACK SURGERY      CATARACT EXTRACTION      right eye    KNEE SURGERY      left    THYROIDECTOMY, PARTIAL      TONSILLECTOMY         Review of patient's allergies indicates:   Allergen Reactions    Oxycodone hcl-oxycodone-asa      Just does not like it because it is an opiod    Pcn [penicillins]     Tramadol Itching       No current facility-administered medications on file prior to encounter.     Current Outpatient Medications on File Prior to Encounter   Medication Sig    gabapentin (NEURONTIN) 600 MG tablet Take 600 mg by mouth 2 (two) times daily.    allopurinol (ZYLOPRIM) 300 MG tablet Take 300 mg by mouth once daily.    gabapentin (NEURONTIN) 300 MG capsule Take 300 mg by mouth 3 (three) times daily.    HYDROcodone-acetaminophen (NORCO) 5-325 mg per tablet Take 1 tablet by mouth every 6 (six) hours as needed.    levothyroxine (SYNTHROID) 100 MCG tablet Take 100 mcg by mouth once daily.    lisinopril 10 MG tablet Take 10 mg by mouth once daily.    metFORMIN (GLUCOPHAGE-XR) 500 MG ER 24hr tablet Take 500 mg by mouth once daily.    metoprolol tartrate (LOPRESSOR) 25 MG tablet Take 25 mg by mouth 2 (two) times daily.    mirtazapine (REMERON) 15 MG tablet Take 45 mg by mouth every evening. Total 45 min at night 2 sep tabs    MOVANTIK 25 mg tablet Take 25 mg by mouth once daily.    nicotine (NICODERM CQ) 21 mg/24 hr Place 1 patch onto the skin once daily.    omeprazole (PRILOSEC) 40 MG capsule Take 40 mg by mouth once daily.    ondansetron (ZOFRAN-ODT) 4 MG TbDL Take 1 tablet (4 mg total) by mouth every 6 (six) hours as needed.    oxybutynin (DITROPAN) 5 MG Tab Take 1 tablet (5 mg total) by mouth 2 (two) times daily. for 3 days     rosuvastatin (CRESTOR) 40 MG Tab Take 40 mg by mouth.    simvastatin (ZOCOR) 40 MG tablet Take 40 mg by mouth every evening.    tamsulosin (FLOMAX) 0.4 mg Cp24 Take 0.4 mg by mouth once daily.    vardenafil (LEVITRA) 20 MG tablet Take 20 mg by mouth as needed.      Family History       Problem Relation (Age of Onset)    Diabetes Paternal Grandmother    No Known Problems Mother, Father          Tobacco Use    Smoking status: Every Day     Current packs/day: 1.00     Types: Cigarettes    Smokeless tobacco: Never   Substance and Sexual Activity    Alcohol use: No    Drug use: No    Sexual activity: Not on file     Review of Systems   Constitutional:  Positive for appetite change and unexpected weight change. Negative for activity change, chills, fatigue and fever.   HENT:  Negative for congestion, rhinorrhea and sore throat.    Respiratory:  Negative for cough, chest tightness, shortness of breath and wheezing.    Cardiovascular:  Negative for chest pain, palpitations and leg swelling.   Gastrointestinal:  Positive for abdominal pain. Negative for anal bleeding, blood in stool, constipation, diarrhea, nausea and vomiting.   Endocrine: Negative for polyuria.   Genitourinary:  Negative for decreased urine volume, difficulty urinating, dysuria, flank pain and hematuria.   Musculoskeletal:  Negative for arthralgias.   Skin:  Negative for rash and wound.   Neurological:  Negative for dizziness, light-headedness and headaches.   Hematological:  Does not bruise/bleed easily.     Objective:     Vital Signs (Most Recent):  Temp: 98 °F (36.7 °C) (07/31/25 1717)  Pulse: 79 (07/31/25 1718)  Resp: 18 (07/31/25 1717)  BP: (!) 164/90 (07/31/25 1717)  SpO2: 96 % (07/31/25 1717) Vital Signs (24h Range):  Temp:  [98 °F (36.7 °C)-98.3 °F (36.8 °C)] 98 °F (36.7 °C)  Pulse:  [] 79  Resp:  [16-18] 18  SpO2:  [96 %-99 %] 96 %  BP: (123-176)/(74-90) 164/90     Weight: 78.8 kg (173 lb 11.6 oz)  Body mass index is 22.92 kg/m².      Physical Exam  Vitals and nursing note reviewed.   Constitutional:       General: He is not in acute distress.     Appearance: He is not ill-appearing.   HENT:      Head: Normocephalic and atraumatic.      Mouth/Throat:      Mouth: Mucous membranes are moist.      Pharynx: Oropharynx is clear.   Eyes:      Extraocular Movements: Extraocular movements intact.      Conjunctiva/sclera: Conjunctivae normal.   Cardiovascular:      Rate and Rhythm: Normal rate and regular rhythm.      Heart sounds: No murmur heard.  Pulmonary:      Effort: Pulmonary effort is normal.      Breath sounds: Normal breath sounds. No wheezing, rhonchi or rales.   Abdominal:      General: Bowel sounds are normal. There is no distension.      Tenderness: There is no abdominal tenderness. There is no right CVA tenderness, guarding or rebound.   Genitourinary:     Comments: Deferred  Musculoskeletal:      Right lower leg: No edema.      Left lower leg: No edema.   Skin:     General: Skin is warm and dry.      Coloration: Skin is not jaundiced.      Findings: No bruising, lesion or rash.   Neurological:      General: No focal deficit present.      Mental Status: He is alert and oriented to person, place, and time.                Significant Labs: All pertinent labs within the past 24 hours have been reviewed.    Significant Imaging: I have reviewed all pertinent imaging results/findings within the past 24 hours.

## 2025-07-31 NOTE — H&P (VIEW-ONLY)
[facility]  History & Physical    Patient Name: Rico Richards  MRN: [id number]  Patient Class: OP- Observation  Admission Date: 7/31/2025  Attending Physician: Dipika Toney MD  Primary Care Provider: Sampson Velazquez MD         Patient information was obtained from patient, relative(s), past medical records, and ER records.     Subjective:     Principal Problem:Abdominal lymphadenopathy    Chief Complaint:   Chief Complaint   Patient presents with    Abdominal Pain     Was seen in er 07/29. Had refused to be admitted that day. Back with his brother today for admittance. Still having abd pain.         HPI: Patient is a 74-year-old  male with past medical history notable for type 2 diabetes mellitus, hypothyroidism, GERD, gastritis , tobacco dependence who presents as a transfer from a [facility] ED for evaluation of abdominal pain and lymphadenopathy.  Patient seen and examined upon arrival to [facility], brother at bedside.  Patient reports that he had back surgery approximately 2 and half years ago and has been having GI issues, generalized abdominal pain, decreased appetite and lactose intolerance since then.  Reports that pain is intermittent, generalized, described as soreness, worse with eating acidic foods, improved with drinking ginger ale.  Reports decreased appetite over the last 2 and half years as well, associated with 50 lb unintentional weight loss.  Has been seen by GI Dr. Adkins at [facility]. Per review of outside GI notes had C scope in 02/2022 which showed small transverse colon polyp that was removed, diverticulosis in transverse/left colon and hemorrhoids. Path of polyp c/w tubular adenoma. Had EGD in 10/2024 which showed chronic gastritis and hyperplastic polyp in stomach s/p polypectomy.     He reports no improvement in appetite with Remeron 45 mg qhs .  Denies fevers, chills, night sweats, nausea/vomiting/diarrhea, dysuria.  Denies  personal history of malignancy.  Patient was initially seen in the ED on 07/29/2025 at which time CTA of the abdomen showed moderate atherosclerosis of the abdominal aorta, negative for mesenteric ischemia but did show retroperitoneal and mesenteric lymphadenopathy.  Patient was offered admission but left AMA.  Returned to the ED on 07/31 for evaluation.  Workup today:  WBC 15, hemoglobin 13, platelets 247, CMP fairly unremarkable, lipase within normal limits.  Had a procalcitonin lactate done 2 days prior which were within normal limits.  UA negative for UTI.  CTA of the chest showed no acute findings.    Past Medical History:   Diagnosis Date    Arthritis     BPH (benign prostatic hyperplasia)     Cataract     GERD (gastroesophageal reflux disease)     Hyperlipidemia     Hypertension     Kidney stones     Thyroid disease        Past Surgical History:   Procedure Laterality Date    BACK SURGERY      CATARACT EXTRACTION      right eye    KNEE SURGERY      left    THYROIDECTOMY, PARTIAL      TONSILLECTOMY         Review of patient's allergies indicates:   Allergen Reactions    Oxycodone hcl-oxycodone-asa      Just does not like it because it is an opiod    Pcn [penicillins]     Tramadol Itching       No current facility-administered medications on file prior to encounter.     Current Outpatient Medications on File Prior to Encounter   Medication Sig    gabapentin (NEURONTIN) 600 MG tablet Take 600 mg by mouth 2 (two) times daily.    allopurinol (ZYLOPRIM) 300 MG tablet Take 300 mg by mouth once daily.    gabapentin (NEURONTIN) 300 MG capsule Take 300 mg by mouth 3 (three) times daily.    HYDROcodone-acetaminophen (NORCO) 5-325 mg per tablet Take 1 tablet by mouth every 6 (six) hours as needed.    levothyroxine (SYNTHROID) 100 MCG tablet Take 100 mcg by mouth once daily.    lisinopril 10 MG tablet Take 10 mg by mouth once daily.    metFORMIN (GLUCOPHAGE-XR) 500 MG ER 24hr tablet Take 500 mg by mouth once daily.     metoprolol tartrate (LOPRESSOR) 25 MG tablet Take 25 mg by mouth 2 (two) times daily.    mirtazapine (REMERON) 15 MG tablet Take 45 mg by mouth every evening. Total 45 min at night 2 sep tabs    MOVANTIK 25 mg tablet Take 25 mg by mouth once daily.    nicotine (NICODERM CQ) 21 mg/24 hr Place 1 patch onto the skin once daily.    omeprazole (PRILOSEC) 40 MG capsule Take 40 mg by mouth once daily.    ondansetron (ZOFRAN-ODT) 4 MG TbDL Take 1 tablet (4 mg total) by mouth every 6 (six) hours as needed.    oxybutynin (DITROPAN) 5 MG Tab Take 1 tablet (5 mg total) by mouth 2 (two) times daily. for 3 days    rosuvastatin (CRESTOR) 40 MG Tab Take 40 mg by mouth.    simvastatin (ZOCOR) 40 MG tablet Take 40 mg by mouth every evening.    tamsulosin (FLOMAX) 0.4 mg Cp24 Take 0.4 mg by mouth once daily.    vardenafil (LEVITRA) 20 MG tablet Take 20 mg by mouth as needed.      Family History       Problem Relation (Age of Onset)    Diabetes Paternal Grandmother    No Known Problems Mother, Father          Tobacco Use    Smoking status: Every Day     Current packs/day: 1.00     Types: Cigarettes    Smokeless tobacco: Never   Substance and Sexual Activity    Alcohol use: No    Drug use: No    Sexual activity: Not on file     Review of Systems   Constitutional:  Positive for appetite change and unexpected weight change. Negative for activity change, chills, fatigue and fever.   HENT:  Negative for congestion, rhinorrhea and sore throat.    Respiratory:  Negative for cough, chest tightness, shortness of breath and wheezing.    Cardiovascular:  Negative for chest pain, palpitations and leg swelling.   Gastrointestinal:  Positive for abdominal pain. Negative for anal bleeding, blood in stool, constipation, diarrhea, nausea and vomiting.   Endocrine: Negative for polyuria.   Genitourinary:  Negative for decreased urine volume, difficulty urinating, dysuria, flank pain and hematuria.   Musculoskeletal:  Negative for arthralgias.   Skin:   Negative for rash and wound.   Neurological:  Negative for dizziness, light-headedness and headaches.   Hematological:  Does not bruise/bleed easily.     Objective:     Vital Signs (Most Recent):  Temp: 98 °F (36.7 °C) (07/31/25 1717)  Pulse: 79 (07/31/25 1718)  Resp: 18 (07/31/25 1717)  BP: (!) 164/90 (07/31/25 1717)  SpO2: 96 % (07/31/25 1717) Vital Signs (24h Range):  Temp:  [98 °F (36.7 °C)-98.3 °F (36.8 °C)] 98 °F (36.7 °C)  Pulse:  [] 79  Resp:  [16-18] 18  SpO2:  [96 %-99 %] 96 %  BP: (123-176)/(74-90) 164/90     Weight: 78.8 kg (173 lb 11.6 oz)  Body mass index is 22.92 kg/m².     Physical Exam  Vitals and nursing note reviewed.   Constitutional:       General: He is not in acute distress.     Appearance: He is not ill-appearing.   HENT:      Head: Normocephalic and atraumatic.      Mouth/Throat:      Mouth: Mucous membranes are moist.      Pharynx: Oropharynx is clear.   Eyes:      Extraocular Movements: Extraocular movements intact.      Conjunctiva/sclera: Conjunctivae normal.   Cardiovascular:      Rate and Rhythm: Normal rate and regular rhythm.      Heart sounds: No murmur heard.  Pulmonary:      Effort: Pulmonary effort is normal.      Breath sounds: Normal breath sounds. No wheezing, rhonchi or rales.   Abdominal:      General: Bowel sounds are normal. There is no distension.      Tenderness: There is no abdominal tenderness. There is no right CVA tenderness, guarding or rebound.   Genitourinary:     Comments: Deferred  Musculoskeletal:      Right lower leg: No edema.      Left lower leg: No edema.   Skin:     General: Skin is warm and dry.      Coloration: Skin is not jaundiced.      Findings: No bruising, lesion or rash.   Neurological:      General: No focal deficit present.      Mental Status: He is alert and oriented to person, place, and time.                Significant Labs: All pertinent labs within the past 24 hours have been reviewed.    Significant Imaging: I have reviewed all  "pertinent imaging results/findings within the past 24 hours.  Assessment/Plan:     Assessment & Plan  Abdominal lymphadenopathy  - Pt reports 2.5 year hx of generalized abdominal pain, decreased appetite, 50 lb unintentional weight loss. Differential for TAMMY includes possible malignancy vs. Lymphoma vs occult infectious process. Pt denies personal hx of malignancy but does have significant smoking hx.   - Per review of outside GI notes had C scope in 02/2022 which showed small transverse colon polyp that was removed, diverticulosis in transverse/left colon and hemorrhoids. Path of polyp c/w tubular adenoma. Had EGD in 10/2024 which showed chronic gastritis and hyperplastic polyp in stomach s/p polypectomy.  - Consult Heme Onc for eval   - NPO at [facility] in case biopsy is warranted     Leukocytosis  - leukocytosis without left shift, no overt s/s of infection. CTA abd and CTA chest with no acute infectious etiology identified. UA neg for UTI   - Hematology/Oncology consult  - monitor CBC     Type 2 diabetes mellitus  Patient's most recent hemoglobin A1C and blood glucose results are listed below.  A1C  Recent Labs   Lab 08/27/24  1013 10/02/24  1027 04/07/25  0711   Hemoglobin A1C 5.5 5.6 5.6      Fingerstick glucose  No results for input(s): "POCTGLUCOSE" in the last 72 hours.   Inpatient insulin regimen  insulin aspart U-100 pen 0-5 Units, Before meals & nightly PRN, Subcutaneous    Plan  - Hold oral hypoglycemics while patient is in the hospital  - Current inpatient insulin regimen is listed above  - Patient's glucose is controlled  - A1c with AM labs   Hypothyroidism  TSH just below lower limit of normal at 0.384, FT4 wnl   Continue home levothyroxine     Tobacco dependence  Reports smoking 2ppd for many years   Dangers of cigarette smoking were reviewed with patient in detail. Patient was Counseled for 3-10 minutes. Nicotine replacement options were discussed. Nicotine replacement was discussed- prescribed  GERD " (gastroesophageal reflux disease)  - hx gastritis, takes prilosec BID, followed by [facility] GI Dr. Adkins  - Continue BID PPI     VTE Risk Mitigation (From admission, onward)           Ordered     IP VTE HIGH RISK PATIENT  Once         07/31/25 1657     Place sequential compression device  Until discontinued         07/31/25 1657     Reason for No Pharmacological VTE Prophylaxis  Once        Question:  Reasons:  Answer:  Physician Provided (leave comment)  Comment:  possible procedure    07/31/25 1657                  On 07/31/2025, patient should be placed in hospital observation services under my care.             Dipika Toney MD  Department of Hospital Medicine  '[facility] - Telemetry ([facility])

## 2025-08-01 ENCOUNTER — TELEPHONE (OUTPATIENT)
Dept: RADIOLOGY | Facility: HOSPITAL | Age: 75
End: 2025-08-01
Payer: MEDICARE

## 2025-08-01 VITALS
RESPIRATION RATE: 18 BRPM | DIASTOLIC BLOOD PRESSURE: 74 MMHG | BODY MASS INDEX: 22.8 KG/M2 | WEIGHT: 172.81 LBS | HEART RATE: 97 BPM | TEMPERATURE: 98 F | SYSTOLIC BLOOD PRESSURE: 129 MMHG | OXYGEN SATURATION: 97 %

## 2025-08-01 DIAGNOSIS — R59.1 LYMPHADENOPATHY: Primary | ICD-10-CM

## 2025-08-01 LAB
ABSOLUTE NEUTROPHIL MANUAL (OHS): 5.1 K/UL
ANION GAP (OHS): 8 MMOL/L (ref 8–16)
ANISOCYTOSIS BLD QL SMEAR: SLIGHT
BUN SERPL-MCNC: 12 MG/DL (ref 8–23)
CALCIUM SERPL-MCNC: 8.9 MG/DL (ref 8.7–10.5)
CHLORIDE SERPL-SCNC: 106 MMOL/L (ref 95–110)
CO2 SERPL-SCNC: 26 MMOL/L (ref 23–29)
CREAT SERPL-MCNC: 0.8 MG/DL (ref 0.5–1.4)
CRP SERPL-MCNC: 0.8 MG/L
EOSINOPHIL NFR BLD MANUAL: 4 % (ref 0–8)
ERYTHROCYTE [DISTWIDTH] IN BLOOD BY AUTOMATED COUNT: 14.1 % (ref 11.5–14.5)
ERYTHROCYTE [SEDIMENTATION RATE] IN BLOOD: 6 MM/HR
GFR SERPLBLD CREATININE-BSD FMLA CKD-EPI: >60 ML/MIN/1.73/M2
GLUCOSE SERPL-MCNC: 91 MG/DL (ref 70–110)
HAV IGM SERPL QL IA: NORMAL
HBV CORE IGM SERPL QL IA: NORMAL
HBV SURFACE AG SERPL QL IA: NORMAL
HCT VFR BLD AUTO: 40.5 % (ref 40–54)
HCV AB SERPL QL IA: NORMAL
HGB BLD-MCNC: 13.5 GM/DL (ref 14–18)
HIV 1+2 AB+HIV1 P24 AG SERPL QL IA: NORMAL
HOLD SPECIMEN: NORMAL
LDH SERPL-CCNC: 149 U/L (ref 110–260)
LYMPHOCYTES NFR BLD MANUAL: 57 % (ref 18–48)
MCH RBC QN AUTO: 31 PG (ref 27–31)
MCHC RBC AUTO-ENTMCNC: 33.3 G/DL (ref 32–36)
MCV RBC AUTO: 93 FL (ref 82–98)
MONOCYTES NFR BLD MANUAL: 6 % (ref 4–15)
NEUTROPHILS NFR BLD MANUAL: 33 % (ref 38–73)
NUCLEATED RBC (/100WBC) (OHS): 0 /100 WBC
PATHOLOGIST REVIEW - CBC/DIFF (OHS): NORMAL
PLATELET # BLD AUTO: 236 K/UL (ref 150–450)
PLATELET BLD QL SMEAR: ABNORMAL
PMV BLD AUTO: 9.5 FL (ref 9.2–12.9)
POCT GLUCOSE: 91 MG/DL (ref 70–110)
POCT GLUCOSE: 96 MG/DL (ref 70–110)
POTASSIUM SERPL-SCNC: 4.1 MMOL/L (ref 3.5–5.1)
RBC # BLD AUTO: 4.35 M/UL (ref 4.6–6.2)
SODIUM SERPL-SCNC: 140 MMOL/L (ref 136–145)
URATE SERPL-MCNC: 3.3 MG/DL (ref 3.4–7)
WBC # BLD AUTO: 15.43 K/UL (ref 3.9–12.7)

## 2025-08-01 PROCEDURE — 84550 ASSAY OF BLOOD/URIC ACID: CPT | Performed by: INTERNAL MEDICINE

## 2025-08-01 PROCEDURE — 25000003 PHARM REV CODE 250: Performed by: INTERNAL MEDICINE

## 2025-08-01 PROCEDURE — 63600175 PHARM REV CODE 636 W HCPCS: Mod: JZ,TB | Performed by: INTERNAL MEDICINE

## 2025-08-01 PROCEDURE — 85652 RBC SED RATE AUTOMATED: CPT | Performed by: INTERNAL MEDICINE

## 2025-08-01 PROCEDURE — 85060 BLOOD SMEAR INTERPRETATION: CPT | Mod: ,,, | Performed by: STUDENT IN AN ORGANIZED HEALTH CARE EDUCATION/TRAINING PROGRAM

## 2025-08-01 PROCEDURE — 87389 HIV-1 AG W/HIV-1&-2 AB AG IA: CPT | Performed by: INTERNAL MEDICINE

## 2025-08-01 PROCEDURE — 80074 ACUTE HEPATITIS PANEL: CPT | Performed by: INTERNAL MEDICINE

## 2025-08-01 PROCEDURE — S4991 NICOTINE PATCH NONLEGEND: HCPCS | Performed by: INTERNAL MEDICINE

## 2025-08-01 PROCEDURE — 36415 COLL VENOUS BLD VENIPUNCTURE: CPT | Performed by: INTERNAL MEDICINE

## 2025-08-01 PROCEDURE — 86140 C-REACTIVE PROTEIN: CPT | Performed by: INTERNAL MEDICINE

## 2025-08-01 PROCEDURE — 85007 BL SMEAR W/DIFF WBC COUNT: CPT | Performed by: INTERNAL MEDICINE

## 2025-08-01 PROCEDURE — 88185 FLOWCYTOMETRY/TC ADD-ON: CPT | Mod: 91 | Performed by: INTERNAL MEDICINE

## 2025-08-01 PROCEDURE — G0378 HOSPITAL OBSERVATION PER HR: HCPCS

## 2025-08-01 PROCEDURE — 80048 BASIC METABOLIC PNL TOTAL CA: CPT | Performed by: INTERNAL MEDICINE

## 2025-08-01 PROCEDURE — 96374 THER/PROPH/DIAG INJ IV PUSH: CPT

## 2025-08-01 PROCEDURE — 83615 LACTATE (LD) (LDH) ENZYME: CPT | Performed by: INTERNAL MEDICINE

## 2025-08-01 RX ORDER — ALUMINUM HYDROXIDE, MAGNESIUM HYDROXIDE, AND SIMETHICONE 1200; 120; 1200 MG/30ML; MG/30ML; MG/30ML
30 SUSPENSION ORAL EVERY 6 HOURS PRN
Qty: 354 ML | Refills: 0 | Status: SHIPPED | OUTPATIENT
Start: 2025-08-01 | End: 2025-08-15

## 2025-08-01 RX ORDER — ALUMINUM HYDROXIDE, MAGNESIUM HYDROXIDE, AND SIMETHICONE 1200; 120; 1200 MG/30ML; MG/30ML; MG/30ML
30 SUSPENSION ORAL EVERY 6 HOURS PRN
Status: DISCONTINUED | OUTPATIENT
Start: 2025-08-01 | End: 2025-08-01 | Stop reason: HOSPADM

## 2025-08-01 RX ADMIN — KETOROLAC TROMETHAMINE 15 MG: 30 INJECTION, SOLUTION INTRAMUSCULAR at 08:08

## 2025-08-01 RX ADMIN — ATORVASTATIN CALCIUM 80 MG: 40 TABLET, FILM COATED ORAL at 08:08

## 2025-08-01 RX ADMIN — LEVOTHYROXINE SODIUM 100 MCG: 0.1 TABLET ORAL at 08:08

## 2025-08-01 RX ADMIN — GABAPENTIN 600 MG: 300 CAPSULE ORAL at 08:08

## 2025-08-01 RX ADMIN — ALUMINUM HYDROXIDE, MAGNESIUM HYDROXIDE, AND DIMETHICONE 30 ML: 200; 20; 200 SUSPENSION ORAL at 01:08

## 2025-08-01 RX ADMIN — NICOTINE 1 PATCH: 21 PATCH, EXTENDED RELEASE TRANSDERMAL at 08:08

## 2025-08-01 RX ADMIN — ALLOPURINOL 300 MG: 300 TABLET ORAL at 08:08

## 2025-08-01 RX ADMIN — SENNOSIDES AND DOCUSATE SODIUM 1 TABLET: 50; 8.6 TABLET ORAL at 09:08

## 2025-08-01 RX ADMIN — PANTOPRAZOLE SODIUM 40 MG: 40 TABLET, DELAYED RELEASE ORAL at 08:08

## 2025-08-01 NOTE — ASSESSMENT & PLAN NOTE
- leukocytosis without left shift, no overt s/s of infection. CTA abd and CTA chest with no acute infectious etiology identified. UA neg for UTI   - Blood cultures ngtd  - no blasts noted on smear

## 2025-08-01 NOTE — PLAN OF CARE
Awaiting virtual access for consult. In the mean time:     Ideally for lymphoproliferative disorders we like a surgical excisional biopsy but this will be difficult to obtain given the location of nodes; my suggestion would be see if IR could do a core biopsy just to take a look and then he needs to get a PET scan outpatient to see if we can isolate an easier to access LN for surgical excisional biopsy.    Additionally, his labs are faily stable so doesn't appear to be a high grade process and work up can be completed as an outpatient. We should get an LDH, uric acid, hepatitis panel, HIV, ESR, CRP, and flow cytometry today. Thankfully, there are no blasts so work up as an outpatient is reasonable.     Formal consult note to follow.     Kim Chávez M.D.   Oncology and Benign Hematology

## 2025-08-01 NOTE — CONSULTS
Chart reviewed by Dr. Cesar.       ASSESSMENT/PLAN:    Lymphadenopathy    The order for a CT biopsy abdominal lymph node has been placed and the procedure will be performed asap.          Thank you for the consult.

## 2025-08-01 NOTE — PLAN OF CARE
[facility] - Telemetry (Hospital)  Initial Discharge Assessment       Primary Care Provider: Burt Ronquillo MD    Admission Diagnosis: Lymphadenopathy [R59.1]  Generalized abdominal pain [R10.84]    Admission Date: 7/31/2025  Expected Discharge Date:     Transition of Care Barriers: None    Payor: MEDICARE / Plan: MEDICARE PART A & B / Product Type: Government /     Extended Emergency Contact Information  Primary Emergency Contact: MauriceMaverick   [place]  Mobile Phone: [phone]  Relation: Brother    Discharge Plan A: Home with family         [facility] - [place], [place] - [address]  [address] [place] [place] [address]  Phone: [phone] Fax: [phone]    [id number] - [place], [place] - [address] AT [facility] [address]  [address] [place] [address]  [place] [address]  Phone: [phone] Fax: [phone]      Initial Assessment (most recent)       Adult Discharge Assessment - 08/01/25 1121          Discharge Assessment    Assessment Type Discharge Planning Assessment     Confirmed/corrected address, phone number and insurance Yes     Confirmed Demographics Correct on Facesheet     Source of Information patient;family     Communicated [place] with patient/caregiver Date not available/Unable to determine     People in Home alone     Facility Arrived From: home     Do you expect to return to your current living situation? Yes     Do you have help at home or someone to help you manage your care at home? Yes     Who are your caregiver(s) and their phone number(s)? neighbor     Prior to hospitilization cognitive status: Alert/Oriented     Current cognitive status: Alert/Oriented     Walking or Climbing Stairs Difficulty yes     Walking or Climbing Stairs ambulation difficulty, requires equipment     Mobility Management cane and RW     Dressing/Bathing Difficulty no     Equipment Currently Used at Home cane, straight;walker, rolling     Readmission within 30  days? No     Patient currently being followed by outpatient case management? No     Do you currently have service(s) that help you manage your care at home? No     Do you take prescription medications? Yes     Do you have prescription coverage? Yes     Do you have any problems affording any of your prescribed medications? No     Is the patient taking medications as prescribed? yes     Who is going to help you get home at discharge? neighbor     How do you get to doctors appointments? car, drives self     Are you on dialysis? No     Do you take coumadin? No     Discharge Plan A Home with family     DME Needed Upon Discharge  none     Discharge Plan discussed with: Patient;Sibling     Name(s) and Number(s) Maverick Richards - brother     Transition of Care Barriers None        Transportation Needs    In the past 12 months, has lack of transportation kept you from medical appointments or from getting medications? No     In the past 12 months, has lack of transportation kept you from meetings, work, or from getting things needed for daily living? No                   Anticipated DC dispo: home with family  Prior Level of Function: independent with ADLs  People in home: alone    Comments:  SW met with patient and brother at bedside to introduce role and discuss discharge planning. Patient's neighbor will be help at home and brother can provide transport at time of discharge. Confirmed demographics, insurance, and emergency contacts. SW updated Patient's whiteboard with contact information and anticipated DC plan. CM discharge needs depends on hospital progress. SW will continue following to assist with other needs.

## 2025-08-01 NOTE — PLAN OF CARE
A210/A210 MIAN Richards is a 74 y.o.male admitted on 7/31/2025 for Abdominal lymphadenopathy   Code Status: DNR MRN: [id number]   Review of patient's allergies indicates:   Allergen Reactions    Oxycodone hcl-oxycodone-asa      Just does not like it because it is an opiod    Pcn [penicillins]     Tramadol Itching     Past Medical History:   Diagnosis Date    Arthritis     BPH (benign prostatic hyperplasia)     Cataract     GERD (gastroesophageal reflux disease)     Hyperlipidemia     Hypertension     Kidney stones     Thyroid disease       PRN meds    acetaminophen, 650 mg, Q4H PRN  aluminum-magnesium hydroxide-simethicone, 30 mL, Q6H PRN  dextrose 50%, 12.5 g, PRN  dextrose 50%, 25 g, PRN  glucagon (human recombinant), 1 mg, PRN  glucose, 16 g, PRN  glucose, 24 g, PRN  hydrALAZINE, 10 mg, Q6H PRN  insulin aspart U-100, 0-5 Units, QID (AC + HS) PRN  melatonin, 6 mg, Nightly PRN  naloxone, 0.02 mg, PRN  ondansetron, 4 mg, Q8H PRN  polyethylene glycol, 17 g, Daily PRN  prochlorperazine, 5 mg, Q6H PRN  sodium chloride 0.9%, 10 mL, Q12H PRN           Pt oriented x4.  VSS.  Pt remained afebrile throughout this shift.   All meds administered per order.   Pt remained free of falls this shift.   Plan of care reviewed. Patient verbalizes understanding.   Pt moving/turning independently.   Bed low, side rails up x 2, wheels locked, call light in reach.   Patient instructed to call for assistance.  Patient education provided    IV removed, cardiac monitor returned. Pt left with all belongings.                 Jaziel Coma Scale Score: 15     Lead Monitored: Lead II Rhythm: normal sinus rhythm Frequency/Ectopy: PACs  Cardiac/Telemetry Box Number: [id number]  VTE Core Measure: (SCDs) Sequential compression device initiated/maintained Last Bowel Movement: 07/30/25  Diet Adult Regular Lactose Free; Standard Tray  Diet Adult Regular     Ihsan Score: 22  Fall Risk Score: 11  Accucheck [x]   Freq? AC/HS     Lines/Drains/Airways        Peripheral Intravenous Line  Duration             Peripheral IV Single Lumen 07/31/25 1150 20 G Anterior;Left Forearm 1 day

## 2025-08-01 NOTE — TELEPHONE ENCOUNTER
Interventional Radiology  Scheduled 11:00AM lymph node biopsy for 8/4/25 w/patient.  Instructed pt. to arrive for 9:30AM to the hospital ([address]/ [address]tr[address]) off [facility] in [place] and check in at Patient Registration located on the first floor.  He must have a ride (brother) and NPO after midnight the night before.  Pt. denies taking ASA or other blood thinners, NSAIDs, fish oil, or GLP-1/GIP agonists.  Pt. can take morning medications the day of procedure with a small sip of water.  I gave patient our number [phone] and he verbalized understanding of all instructions.   16-Aug-2018 06:14

## 2025-08-01 NOTE — ASSESSMENT & PLAN NOTE
- hx gastritis, takes prilosec BID, followed by [facility] GI Dr. Adkins  - Continue BID PPI   - Pt had some improvement in abd pain after maalox, will continue

## 2025-08-01 NOTE — HOSPITAL COURSE
Hematology consulted- case was discussed with on-call hematologist Dr. Chávez who recommended IR guided biopsy as well as labs including HIV, hepatitis panel, ESR, CRP, LDH, uric acid, flow cytometry.  CBC showed no blasts on peripheral review.  Per Dr. Chávez, she does not suspect a high-grade process and feels that workup can be completed as outpatient. Leukocytosis stable at 15, ESR/CRP wnl, Uric acid low, LDH wnl. HIV neg. Hepatitis panel and flow cytometry pending.     IR guided biopsy of lymph node was initially planned for 08/01/2025 but now unable to be performed due to other emergent cases/scheduling.  Patient has been set up for outpatient lymph node biopsy on 08/04/2025 with Interventional Radiology.  We will follow up with Hematology/Oncology thereafter to review results and for further management (ambulatory referral sent to heme/Onc and Dr. Chávez to message cancer navigator to facilitate).     The above was reviewed in detail with patient and his brother at at bedside.  They expressed understanding.  Patient is seen and examined on day of discharge and appears stable for discharge home today per my exam.  Follow up with PCP within 3-5 days, follow up with lymph node biopsy as scheduled on 08/04/2025 at 11:00 a.m., follow up with Hematology/Oncology ASAP.

## 2025-08-01 NOTE — DISCHARGE SUMMARY
[facility] Medicine  Discharge Summary      Patient Name: Rico Richards  MRN: [id number]  DOTTY: [id number]  Patient Class: OP- Observation  Admission Date: 7/31/2025  Hospital Length of Stay: 1 days  Discharge Date and Time: 08/01/2025 3:16 PM  Attending Physician: Dipika Toney MD   Discharging Provider: Dipika Toney MD  Primary Care Provider: Burt Ronquillo MD    Primary Care Team: Networked reference to record PCT     HPI:   Patient is a 74-year-old  male with past medical history notable for type 2 diabetes mellitus, hypothyroidism, GERD, gastritis , tobacco dependence who presents as a transfer from a [facility] ED for evaluation of abdominal pain and lymphadenopathy.  Patient seen and examined upon arrival to [facility], brother at bedside.  Patient reports that he had back surgery approximately 2 and half years ago and has been having GI issues, generalized abdominal pain, decreased appetite and lactose intolerance since then.  Reports that pain is intermittent, generalized, described as soreness, worse with eating acidic foods, improved with drinking ginger ale.  Reports decreased appetite over the last 2 and half years as well, associated with 50 lb unintentional weight loss.  Has been seen by GI Dr. Adkins at [facility]. Per review of outside GI notes had C scope in 02/2022 which showed small transverse colon polyp that was removed, diverticulosis in transverse/left colon and hemorrhoids. Path of polyp c/w tubular adenoma. Had EGD in 10/2024 which showed chronic gastritis and hyperplastic polyp in stomach s/p polypectomy.     He reports no improvement in appetite with Remeron 45 mg qhs .  Denies fevers, chills, night sweats, nausea/vomiting/diarrhea, dysuria.  Denies personal history of malignancy.  Patient was initially seen in the ED on 07/29/2025 at which time CTA of the abdomen showed moderate atherosclerosis of the abdominal aorta, negative for  mesenteric ischemia but did show retroperitoneal and mesenteric lymphadenopathy.  Patient was offered admission but left AMA.  Returned to the ED on 07/31 for evaluation.  Workup today:  WBC 15, hemoglobin 13, platelets 247, CMP fairly unremarkable, lipase within normal limits.  Had a procalcitonin lactate done 2 days prior which were within normal limits.  UA negative for UTI.  CTA of the chest showed no acute findings.    * No surgery found *      Hospital Course:   Hematology consulted- case was discussed with on-call hematologist Dr. Chávez who recommended IR guided biopsy as well as labs including HIV, hepatitis panel, ESR, CRP, LDH, uric acid, flow cytometry.  CBC showed no blasts on peripheral review.  Per Dr. Chávez, she does not suspect a high-grade process and feels that workup can be completed as outpatient. Leukocytosis stable at 15, ESR/CRP wnl, Uric acid low, LDH wnl. HIV neg. Hepatitis panel and flow cytometry pending.     IR guided biopsy of lymph node was initially planned for 08/01/2025 but now unable to be performed due to other emergent cases/scheduling.  Patient has been set up for outpatient lymph node biopsy on 08/04/2025 with Interventional Radiology.  We will follow up with Hematology/Oncology thereafter to review results and for further management (ambulatory referral sent to heme/Onc and Dr. Chávez to message cancer navigator to facilitate).     The above was reviewed in detail with patient and his brother at at bedside.  They expressed understanding.  Patient is seen and examined on day of discharge and appears stable for discharge home today per my exam.  Follow up with PCP within 3-5 days, follow up with lymph node biopsy as scheduled on 08/04/2025 at 11:00 a.m., follow up with Hematology/Oncology ASAP.     Goals of Care Treatment Preferences:  Code Status: DNR         Consults:   Consults (From admission, onward)          Status Ordering Provider     Inpatient consult to Social Work   Once        Provider:  (Not yet assigned)    Completed DIDI POLANCO     Inpatient consult to Interventional Radiology  Once        Provider:  (Not yet assigned)    Completed DIDI POLANCO     Inpatient consult to Hematology/Oncology  Once        Provider:  Belem Dietz MD    Acknowledged DIDI POLANCO            Assessment & Plan  Abdominal lymphadenopathy  - Pt reports 2.5 year hx of generalized abdominal pain, decreased appetite, 50 lb unintentional weight loss. Differential for TAMMY includes possible malignancy vs. Lymphoma vs occult infectious process. Pt denies personal hx of malignancy but does have significant smoking hx.   - Per review of outside GI notes had C scope in 02/2022 which showed small transverse colon polyp that was removed, diverticulosis in transverse/left colon and hemorrhoids. Path of polyp c/w tubular adenoma. Had EGD in 10/2024 which showed chronic gastritis and hyperplastic polyp in stomach s/p polypectomy.  - Heme/Onc consulted   - case was discussed with on-call hematologist Dr. Chávez who recommended IR guided biopsy as well as labs including HIV, hepatitis panel, ESR, CRP, LDH, uric acid, flow cytometry.  CBC showed no blasts on peripheral review.  Per Dr. Chávez, she does not suspect a high-grade process and feels that workup can be completed as outpatient. Leukocytosis stable at 15, ESR/CRP wnl, Uric acid low, LDH wnl. HIV neg. Hepatitis panel and flow cytometry pending.     IR guided biopsy of lymph node was initially planned for 08/01/2025 but now unable to be performed due to other emergent cases/scheduling.  Patient has been set up for outpatient lymph node biopsy on 08/04/2025 with Interventional Radiology.  We will follow up with Hematology/Oncology thereafter to review results and for further management (ambulatory referral sent to heme/Onc and Dr. Chávez to [facility] [place] to facilitate).     Leukocytosis  - leukocytosis without left shift, no overt  s/s of infection. CTA abd and CTA chest with no acute infectious etiology identified. UA neg for UTI   - Blood cultures ngtd  - no blasts noted on smear     Type 2 diabetes mellitus  Patient's most recent hemoglobin A1C and blood glucose results are listed below.  A1C  Recent Labs   Lab 08/27/24  1013 10/02/24  1027 04/07/25  0711   Hemoglobin A1C 5.5 5.6 5.6      Fingerstick glucose  Recent Labs     07/31/25  2119 08/01/25  0513 08/01/25  1228   POCTGLUCOSE 120* 91 96      Inpatient insulin regimen  insulin aspart U-100 pen 0-5 Units, Before meals & nightly PRN, Subcutaneous    Plan  - Hold oral hypoglycemics while patient is in the hospital  - Current inpatient insulin regimen is listed above  - Patient's glucose is controlled  - A1c with AM labs   Hypothyroidism  TSH just below lower limit of normal at 0.384, FT4 wnl   Continue home levothyroxine     Tobacco dependence  Reports smoking 2ppd for many years   Dangers of cigarette smoking were reviewed with patient in detail. Patient was Counseled for 3-10 minutes. Nicotine replacement options were discussed. Nicotine replacement was discussed- prescribed  GERD (gastroesophageal reflux disease)  - hx gastritis, takes prilosec BID, followed by [facility] GI Dr. Adkins  - Continue BID PPI   - Pt had some improvement in abd pain after maalox, will continue     Final Active Diagnoses:    Diagnosis Date Noted POA    PRINCIPAL PROBLEM:  Abdominal lymphadenopathy [R59.0] 07/31/2025 Yes    Leukocytosis [D72.829] 07/31/2025 Yes    Type 2 diabetes mellitus [E11.9] 07/31/2025 Yes     Chronic    Hypothyroidism [E03.9] 07/31/2025 Yes     Chronic    Tobacco dependence [F17.200] 07/31/2025 Yes     Chronic    GERD (gastroesophageal reflux disease) [K21.9] 07/31/2025 Yes     Chronic      Problems Resolved During this Admission:       Discharged Condition: good    Disposition: Home or Self Care    Follow Up:   Follow-up Information       Burt Ronquillo MD. Schedule an appointment as  soon as possible for a visit in 3 day(s).    Specialty: Internal Medicine  Contact information:  [address] [facility] [address]  [phone]               Dino Adkins MD Follow up in 1 week(s).    Specialty: Gastroenterology  Contact information:  [address] [facility] [address]  [phone]               Interventional Radiology Follow up.    Why: 11:00AM lymph node biopsy for 8/4/25   Please call the number above with any questions  Contact information:  [phone]             [facility] - Hem/Onc [facility]. Schedule an appointment as soon as possible for a visit.    Specialty: Hematology and Oncology  Why: call to schedule appointment, referral has been sent  Contact information:  [address] [facility] [address] [phone]  Additional information:  Please park on the [address] side and use the Clinic entrance. Check in on the 4th floor, to the right.                         Patient Instructions:      Ambulatory referral/consult to Smoking Cessation Program   Standing Status: Future   Referral Priority: Routine Referral Type: Consultation   Referral Reason: Specialty Services Required   Requested Specialty: CTTS   Number of Visits Requested: 1     Ambulatory referral/consult to Hematology / Oncology   Standing Status: Future   Referral Priority: Urgent Referral Type: Consultation   Referral Reason: Specialty Services Required   Requested Specialty: Hematology and Oncology   Number of Visits Requested: 1     Diet Adult Regular     Notify your health care provider if you experience any of the following:  temperature >100.4     Notify your health care provider if you experience any of the following:  severe uncontrolled pain     Activity as tolerated       Significant Diagnostic Studies:  see hospital Course     Pending Diagnostic Studies:       Procedure Component Value Units Date/Time    CT Guided Needle Placement [[id number]]     Order Status: Sent Lab  Status: No result     Flow Cytometry Analysis (Peripheral Blood) [[id number]] Collected: 08/01/25 0939    Order Status: Sent Lab Status: In process Updated: 08/01/25 0956    Specimen: Blood     Hepatitis panel, acute [[id number]] Collected: 08/01/25 0939    Order Status: Sent Lab Status: In process Updated: 08/01/25 0956    Specimen: Blood            Medications:  Reconciled Home Medications:      Medication List        PAUSE taking these medications      * gabapentin 300 MG capsule  Wait to take this until your doctor or other care provider tells you to start again.  Commonly known as: NEURONTIN  Take 300 mg by mouth 3 (three) times daily.  You also have another medication with the same name that you may need to continue taking.           * This list has 1 medication(s) that are the same as other medications prescribed for you. Read the directions carefully, and ask your doctor or other care provider to review them with you.                START taking these medications      aluminum-magnesium hydroxide-simethicone 200-200-20 mg/5 mL Susp  Commonly known as: MAALOX  Take 30 mLs by mouth every 6 (six) hours as needed.            CHANGE how you take these medications      * gabapentin 600 MG tablet  Commonly known as: NEURONTIN  Take 600 mg by mouth 2 (two) times daily.  What changed: Another medication with the same name was paused. Ask your nurse or doctor if you should take this medication.     * nicotine 21 mg/24 hr  Commonly known as: NICODERM CQ  Place 1 patch onto the skin once daily.  What changed: Another medication with the same name was added. Make sure you understand how and when to take each.     * nicotine 21 mg/24 hr  Commonly known as: NICODERM CQ  Place 1 patch onto the skin once daily.  What changed: You were already taking a medication with the same name, and this prescription was added. Make sure you understand how and when to take each.           * This list has 3 medication(s) that are the same  as other medications prescribed for you. Read the directions carefully, and ask your doctor or other care provider to review them with you.                CONTINUE taking these medications      allopurinoL 300 MG tablet  Commonly known as: ZYLOPRIM  Take 300 mg by mouth once daily.     HYDROcodone-acetaminophen 5-325 mg per tablet  Commonly known as: NORCO  Take 1 tablet by mouth every 6 (six) hours as needed.     levothyroxine 100 MCG tablet  Commonly known as: SYNTHROID  Take 100 mcg by mouth once daily.     metFORMIN 500 MG ER 24hr tablet  Commonly known as: GLUCOPHAGE-XR  Take 500 mg by mouth once daily.     mirtazapine 15 MG tablet  Commonly known as: REMERON  Take 45 mg by mouth every evening. Total 45 min at night 2 sep tabs     omeprazole 40 MG capsule  Commonly known as: PRILOSEC  Take 40 mg by mouth once daily.     ondansetron 4 MG Tbdl  Commonly known as: ZOFRAN-ODT  Take 1 tablet (4 mg total) by mouth every 6 (six) hours as needed.     rosuvastatin 40 MG Tab  Commonly known as: CRESTOR  Take 40 mg by mouth.     vardenafiL 20 MG tablet  Commonly known as: LEVITRA  Take 20 mg by mouth as needed.            STOP taking these medications      FLOMAX 0.4 mg Cap  Generic drug: tamsulosin     lisinopriL 10 MG tablet     metoprolol tartrate 25 MG tablet  Commonly known as: LOPRESSOR     MOVANTIK 25 mg tablet  Generic drug: naloxegoL     oxybutynin 5 MG Tab  Commonly known as: DITROPAN     simvastatin 40 MG tablet  Commonly known as: ZOCOR              Indwelling Lines/Drains at time of discharge:   Lines/Drains/Airways       None                       Time spent on the discharge of patient: 45 minutes         Dipika Toney MD  Department of Hospital Medicine  [facility] - Telemetry ([facility])

## 2025-08-01 NOTE — ASSESSMENT & PLAN NOTE
- Pt reports 2.5 year hx of generalized abdominal pain, decreased appetite, 50 lb unintentional weight loss. Differential for TAMMY includes possible malignancy vs. Lymphoma vs occult infectious process. Pt denies personal hx of malignancy but does have significant smoking hx.   - Per review of outside GI notes had C scope in 02/2022 which showed small transverse colon polyp that was removed, diverticulosis in transverse/left colon and hemorrhoids. Path of polyp c/w tubular adenoma. Had EGD in 10/2024 which showed chronic gastritis and hyperplastic polyp in stomach s/p polypectomy.  - Heme/Onc consulted   - case was discussed with on-call hematologist Dr. Chávez who recommended IR guided biopsy as well as labs including HIV, hepatitis panel, ESR, CRP, LDH, uric acid, flow cytometry.  CBC showed no blasts on peripheral review.  Per Dr. Chávez, she does not suspect a high-grade process and feels that workup can be completed as outpatient. Leukocytosis stable at 15, ESR/CRP wnl, Uric acid low, LDH wnl. HIV neg. Hepatitis panel and flow cytometry pending.     IR guided biopsy of lymph node was initially planned for 08/01/2025 but now unable to be performed due to other emergent cases/scheduling.  Patient has been set up for outpatient lymph node biopsy on 08/04/2025 with Interventional Radiology.  We will follow up with Hematology/Oncology thereafter to review results and for further management (ambulatory referral sent to heme/Onc and Dr. Chávez to message cancer navigator to facilitate).

## 2025-08-01 NOTE — PLAN OF CARE
[facility] - Telemetry (Hospital)  Discharge Final Note    Primary Care Provider: Burt Ronquillo MD    Expected Discharge Date: 8/1/2025    Final Discharge Note (most recent)       Final Note - 08/01/25 1517          Final Note    Assessment Type Final Discharge Note     Anticipated Discharge Disposition Home or Self Care        Post-Acute Status    Discharge Delays None known at this time                     Important Message from Medicare             Contact Info       Burt Ronquillo MD   Specialty: Internal Medicine   Relationship: PCP - General    [facility]  [address] [place] [address]   Phone: [phone]       Next Steps: Schedule an appointment as soon as possible for a visit in 3 day(s)    Dino Adkins MD   Specialty: Gastroenterology    [facility]  [address] [facility] [address]   Phone: [phone]       Next Steps: Follow up in 1 week(s)    Interventional Radiology    [phone]       Next Steps: Follow up    Instructions: 11:00AM lymph node biopsy for 8/4/25   Please call the number above with any questions    [facility] Hem/Onc [facility]   Specialty: Hematology and Oncology    [address]  [place] [address]   Phone: [phone]       Next Steps: Schedule an appointment as soon as possible for a visit    Instructions: call to schedule appointment, referral has been sent          DC Disposition: home with family   Transportation: personal transportation     Patient had no equipment or placement needs from .     FATMATA sent in basket messaged to [facility] Hem/Onc department to establish patient with hospital follow up appointment upon DC.    Patient has resources to schedule hospital follow up with non-[facility] PCP on AVS.

## 2025-08-01 NOTE — PROGRESS NOTES
AVS virtually reviewed with patient and his brother Maverick in its entirety with emphasis on diagnosis, diet and lifestyle modifications, medications, follow-up appointments and reasons to return to the ED. Patient also encouraged to utilize their patient portal. Ease and convenience of use reiterated. Education complete and patient voiced understanding with teach back method. All questions answered. Discharge teaching complete. Care Plan and Education templates resolved.

## 2025-08-01 NOTE — PLAN OF CARE
A210/A210 MIAN Richards is a 74 y.o.male admitted on 7/31/2025 for Abdominal lymphadenopathy   Code Status: Full Code MRN: [id number]   Review of patient's allergies indicates:   Allergen Reactions    Oxycodone hcl-oxycodone-asa      Just does not like it because it is an opiod    Pcn [penicillins]     Tramadol Itching     Past Medical History:   Diagnosis Date    Arthritis     BPH (benign prostatic hyperplasia)     Cataract     GERD (gastroesophageal reflux disease)     Hyperlipidemia     Hypertension     Kidney stones     Thyroid disease       PRN meds    acetaminophen, 650 mg, Q4H PRN  dextrose 50%, 12.5 g, PRN  dextrose 50%, 25 g, PRN  glucagon (human recombinant), 1 mg, PRN  glucose, 16 g, PRN  glucose, 24 g, PRN  hydrALAZINE, 10 mg, Q6H PRN  insulin aspart U-100, 0-5 Units, QID (AC + HS) PRN  ketorolac, 15 mg, Q6H PRN  melatonin, 6 mg, Nightly PRN  naloxone, 0.02 mg, PRN  ondansetron, 4 mg, Q8H PRN  polyethylene glycol, 17 g, Daily PRN  prochlorperazine, 5 mg, Q6H PRN  sodium chloride 0.9%, 10 mL, Q12H PRN      Chart check completed. Will continue plan of care.         Jaziel Coma Scale Score: 15     Lead Monitored: Lead II Rhythm: normal sinus rhythm    Cardiac/Telemetry Box Number: [id number]  VTE Core Measure: (SCDs) Sequential compression device initiated/maintained Last Bowel Movement: 07/30/25  Diet NPO Except for: Sips with Medication     Ihsan Score: 22  Fall Risk Score: 11  Accucheck [x]   Freq? achs     Lines/Drains/Airways       Peripheral Intravenous Line  Duration             Peripheral IV Single Lumen 07/31/25 1150 20 G Anterior;Left Forearm <1 day

## 2025-08-01 NOTE — CONSULTS
In-basket message sent to Hematology/ Oncology clinic staff to make sure they got referral for Patient and could schedule hospital follow up.

## 2025-08-01 NOTE — DISCHARGE INSTRUCTIONS
Our goal at [facility] is to always give you outstanding care and exceptional service. You may receive a survey from [employer] by mail, text or e-mail in the next 24-48 hours asking about the care you received with us. The survey should only take 5-10 minutes to complete and is very important to us.     Your feedback provides us with a way to recognize our staff who work tirelessly to provide the best care! Also, your responses help us learn how to improve when your experience was below our aspiration of excellence. We are always looking for ways to improve your stay. We WILL use your feedback to continue making improvements to help us provide the highest quality care. We keep your personal information and feedback confidential. We appreciate your time completing this survey and can't wait to hear from you!!!    We look forward to your continued care with us! Thanks so much for choosing [facility] for your healthcare needs!

## 2025-08-01 NOTE — ASSESSMENT & PLAN NOTE
Patient's most recent hemoglobin A1C and blood glucose results are listed below.  A1C  Recent Labs   Lab 08/27/24  1013 10/02/24  1027 04/07/25  0711   Hemoglobin A1C 5.5 5.6 5.6      Fingerstick glucose  Recent Labs     07/31/25  2119 08/01/25  0513 08/01/25  1228   POCTGLUCOSE 120* 91 96      Inpatient insulin regimen  insulin aspart U-100 pen 0-5 Units, Before meals & nightly PRN, Subcutaneous    Plan  - Hold oral hypoglycemics while patient is in the hospital  - Current inpatient insulin regimen is listed above  - Patient's glucose is controlled  - A1c with AM labs

## 2025-08-02 ENCOUNTER — HOSPITAL ENCOUNTER (EMERGENCY)
Facility: HOSPITAL | Age: 75
Discharge: HOME | End: 2025-08-02
Payer: MEDICARE

## 2025-08-02 VITALS
HEART RATE: 94 BPM | SYSTOLIC BLOOD PRESSURE: 136 MMHG | HEIGHT: 73 IN | OXYGEN SATURATION: 98 % | RESPIRATION RATE: 16 BRPM | BODY MASS INDEX: 22.8 KG/M2 | WEIGHT: 172.06 LBS | DIASTOLIC BLOOD PRESSURE: 77 MMHG

## 2025-08-02 DIAGNOSIS — R10.84 GENERALIZED ABDOMINAL PAIN: Primary | ICD-10-CM

## 2025-08-02 PROCEDURE — 99284 EMERGENCY DEPT VISIT MOD MDM: CPT | Mod: 25,ER

## 2025-08-02 PROCEDURE — 96375 TX/PRO/DX INJ NEW DRUG ADDON: CPT | Mod: ER

## 2025-08-02 PROCEDURE — 63600175 PHARM REV CODE 636 W HCPCS: Mod: JZ,TB,ER

## 2025-08-02 PROCEDURE — 96365 THER/PROPH/DIAG IV INF INIT: CPT | Mod: ER

## 2025-08-02 PROCEDURE — 25000003 PHARM REV CODE 250: Mod: ER

## 2025-08-02 RX ORDER — ONDANSETRON HYDROCHLORIDE 2 MG/ML
4 INJECTION, SOLUTION INTRAVENOUS
Status: COMPLETED | OUTPATIENT
Start: 2025-08-02 | End: 2025-08-02

## 2025-08-02 RX ORDER — LIDOCAINE HYDROCHLORIDE 20 MG/ML
15 SOLUTION OROPHARYNGEAL ONCE
Status: COMPLETED | OUTPATIENT
Start: 2025-08-02 | End: 2025-08-02

## 2025-08-02 RX ORDER — ALUMINUM HYDROXIDE, MAGNESIUM HYDROXIDE, AND SIMETHICONE 1200; 120; 1200 MG/30ML; MG/30ML; MG/30ML
30 SUSPENSION ORAL ONCE
Status: COMPLETED | OUTPATIENT
Start: 2025-08-02 | End: 2025-08-02

## 2025-08-02 RX ORDER — KETOROLAC TROMETHAMINE 30 MG/ML
15 INJECTION, SOLUTION INTRAMUSCULAR; INTRAVENOUS
Status: COMPLETED | OUTPATIENT
Start: 2025-08-02 | End: 2025-08-02

## 2025-08-02 RX ADMIN — PANTOPRAZOLE SODIUM 8 MG/HR: 40 INJECTION, POWDER, FOR SOLUTION INTRAVENOUS at 05:08

## 2025-08-02 RX ADMIN — KETOROLAC TROMETHAMINE 15 MG: 30 INJECTION, SOLUTION INTRAMUSCULAR at 05:08

## 2025-08-02 RX ADMIN — SODIUM CHLORIDE 500 ML: 9 INJECTION, SOLUTION INTRAVENOUS at 05:08

## 2025-08-02 RX ADMIN — LIDOCAINE HYDROCHLORIDE 15 ML: 20 SOLUTION ORAL at 05:08

## 2025-08-02 RX ADMIN — ALUMINUM HYDROXIDE, MAGNESIUM HYDROXIDE, AND DIMETHICONE 30 ML: 200; 20; 200 SUSPENSION ORAL at 05:08

## 2025-08-02 RX ADMIN — ONDANSETRON 4 MG: 2 INJECTION INTRAMUSCULAR; INTRAVENOUS at 05:08

## 2025-08-02 NOTE — ED PROVIDER NOTES
Encounter Date: 8/2/2025       History     Chief Complaint   Patient presents with    Abdominal Pain     That began this morning. Discharged from hospital on yesterday. Follow up Monday for biopsy of stomach. Pt reports stomach feels like it is on fire.     HPI    Rico Richards is a 74 y.o. male with a long history of chronic abdominal pain, GERD, HTN, kidney stones presents today for evaluation of generalized abdominal pain that started this morning.  Was discharged from the hospital yesterday afternoon and had no pain at that time.  Was able to tolerate ensure at home and went to bed feeling fine.  Noticed the burning abdominal pain when he woke up this morning.  No nausea, vomiting, diarrhea, constipation, chest pain, shortness of breath, fever, chills.  Pain is exactly the same as it has been over the last week, not worse and not other new symptoms. In the last week he has had a CTA of the abdomen and pelvis, CTA of the chest and a full panel of labs.  He has mesenteric lymphadenopathy that could represent lymphoma and he is scheduled for an IR guided biopsy of this on 08/04/2025.      Review of patient's allergies indicates:   Allergen Reactions    Oxycodone hcl-oxycodone-asa      Just does not like it because it is an opiod    Pcn [penicillins]     Tramadol Itching     Past Medical History:   Diagnosis Date    Arthritis     BPH (benign prostatic hyperplasia)     Cataract     GERD (gastroesophageal reflux disease)     Hyperlipidemia     Hypertension     Kidney stones     Thyroid disease      Past Surgical History:   Procedure Laterality Date    BACK SURGERY      CATARACT EXTRACTION      right eye    KNEE SURGERY      left    THYROIDECTOMY, PARTIAL      TONSILLECTOMY       Family History   Problem Relation Name Age of Onset    No Known Problems Mother      No Known Problems Father      Diabetes Paternal Grandmother       Social History[1]  Review of Systems   Gastrointestinal:  Positive for abdominal pain.  Negative for constipation, diarrhea and nausea.   All other systems reviewed and are negative.      Physical Exam     Initial Vitals [08/02/25 1656]   BP Pulse Resp Temp SpO2   (!) 145/82 108 20 -- 97 %      MAP       --         Physical Exam    Nursing note and vitals reviewed.  Constitutional: He appears well-developed and well-nourished.   HENT:   Head: Normocephalic and atraumatic.   Right Ear: External ear normal.   Left Ear: External ear normal. Mouth/Throat: Mucous membranes are normal.   Eyes: EOM are normal. Pupils are equal, round, and reactive to light.   Neck: Neck supple.   Normal range of motion.  Cardiovascular:  Normal rate and regular rhythm.           No murmur heard.  Pulmonary/Chest: Breath sounds normal. No respiratory distress. He has no wheezes. He has no rhonchi. He has no rales.   Abdominal: Abdomen is soft. Bowel sounds are normal. He exhibits no distension. There is no abdominal tenderness.   Mild diffuse abdominal tenderness to palpation.  Normal bowel sounds.  No peritoneal signs.   Musculoskeletal:         General: No edema. Normal range of motion.      Cervical back: Normal range of motion and neck supple.     Neurological: He is alert and oriented to person, place, and time.   Skin: Skin is warm and dry. Capillary refill takes less than 2 seconds.         ED Course   Procedures  Labs Reviewed - No data to display       Imaging Results    None          Medications   sodium chloride 0.9% bolus 500 mL 500 mL (500 mLs Intravenous New Bag 8/2/25 1718)   pantoprazole (PROTONIX) 40 mg in 0.9% NaCl 100 mL IVPB (MB+) (8 mg/hr Intravenous New Bag 8/2/25 1728)   ondansetron injection 4 mg (4 mg Intravenous Given 8/2/25 1719)   aluminum-magnesium hydroxide-simethicone 200-200-20 mg/5 mL suspension 30 mL (30 mLs Oral Given 8/2/25 1721)     And   LIDOcaine viscous HCl 2% oral solution 15 mL (15 mLs Oral Given 8/2/25 1721)   ketorolac injection 15 mg (15 mg Intravenous Given 8/2/25 1719)     Medical  "Decision Making  74-year-old male with a history and physical as above.  Presents for evaluation of acute on chronic abdominal pain.  No change in symptoms since discharge yesterday.  No new symptoms.  No red flag symptoms on history.  Has a generalized burning abdominal pain.  No nausea, vomiting, fever, chills.  Last bowel movement was this morning and was normal.  Differentials considered include but not limited to chronic functional abdominal pain, as pain related to previously identified mesenteric lymphadenopathy, other intra-abdominal pathology, other.  No indication for advanced imaging today given that he just had a CT angio of the abdomen and pelvis and chest in the last 3 days.  Extensive laboratory evaluation was completed just yesterday and he has had no change in symptoms.  His vitals are stable here and he is afebrile.  No indication to repeat labs at this time.  He states he does not feel like he needs lab work or imaging he just wants the medicine put through his IV to make his stomach feel better.  On chart review it appears in the past he has gotten a GI cocktail, Toradol, Zofran and IV fluids.  I have ordered these and I have also ordered Protonix.  I will re-evaluate after treatment.  See ED course for details of re-evaluations and final disposition.    Amount and/or Complexity of Data Reviewed  External Data Reviewed: labs, radiology, ECG and notes.     Details: Review of labs over the last week.  White count 729/25 14.75, 7/31/25 15.38, 8/1/25 15.43.  CMP unremarkable yesterday 08/01/2025.  Negative hepatitis panel performed 08/01/2025.  No growth after 72 hours on blood cultures drawn 07/29/2025.  LDH, CRP, uric acid all negative 08/01/2025.   Negative UA on 07/31/2025.    Risk  OTC drugs.  Prescription drug management.               ED Course as of 08/02/25 1753   Sat Aug 02, 2025   1748 Pain nearly completely resolved with treatment.  States it still feels a little "fuzzy "in there but he " is not really having any true pain anymore.  No vomiting here.  IV fluids are still going.  We will finish these and then plan to discharge home.  Instructed him to keep his appointment for his IR guided biopsy.  Understands importance follow up and return precautions.  Stable for discharge home. [KF]      ED Course User Index  [KF] Glo Allen DO                               Clinical Impression:  Final diagnoses:  [R10.84] Generalized abdominal pain (Primary)          ED Disposition Condition    Discharge Stable          ED Prescriptions    None       Follow-up Information       Follow up With Specialties Details Why Contact Info    Burt Ronquillo MD Internal Medicine Call  As needed [address] [place] [id number] [phone]      [facility] - Emergency Dept Emergency Medicine Go to  As needed, If symptoms worsen [id number] [facility] [address]  Emergency Department  [facility] [id number] [phone]                   [1]   Social History  Tobacco Use    Smoking status: Every Day     Current packs/day: 1.00     Types: Cigarettes    Smokeless tobacco: Never   Substance Use Topics    Alcohol use: No    Drug use: No        Glo Allen DO  08/02/25 [id number]

## 2025-08-02 NOTE — DISCHARGE INSTRUCTIONS
Keep your scheduled follow up appointments including your appointment for your biopsy 08/04/2025.  Take all of your medications at home as directed.  Return to the emergency department for any new or concerning symptoms.    Dr. Allen

## 2025-08-04 ENCOUNTER — PATIENT OUTREACH (OUTPATIENT)
Dept: ADMINISTRATIVE | Facility: CLINIC | Age: 75
End: 2025-08-04
Payer: MEDICARE

## 2025-08-04 ENCOUNTER — HOSPITAL ENCOUNTER (OUTPATIENT)
Dept: RADIOLOGY | Facility: HOSPITAL | Age: 75
Discharge: HOME | End: 2025-08-04
Attending: INTERNAL MEDICINE
Payer: MEDICARE

## 2025-08-04 ENCOUNTER — HOSPITAL ENCOUNTER (EMERGENCY)
Facility: HOSPITAL | Age: 75
Discharge: HOME | End: 2025-08-04
Attending: EMERGENCY MEDICINE
Payer: MEDICARE

## 2025-08-04 VITALS
SYSTOLIC BLOOD PRESSURE: 119 MMHG | HEART RATE: 70 BPM | DIASTOLIC BLOOD PRESSURE: 69 MMHG | WEIGHT: 172 LBS | HEIGHT: 73 IN | BODY MASS INDEX: 22.8 KG/M2 | RESPIRATION RATE: 16 BRPM | OXYGEN SATURATION: 97 %

## 2025-08-04 VITALS
HEIGHT: 73 IN | OXYGEN SATURATION: 95 % | RESPIRATION RATE: 20 BRPM | SYSTOLIC BLOOD PRESSURE: 138 MMHG | DIASTOLIC BLOOD PRESSURE: 73 MMHG | BODY MASS INDEX: 22.48 KG/M2 | TEMPERATURE: 97 F | WEIGHT: 169.63 LBS | HEART RATE: 76 BPM

## 2025-08-04 DIAGNOSIS — K21.9 GASTROESOPHAGEAL REFLUX DISEASE, UNSPECIFIED WHETHER ESOPHAGITIS PRESENT: ICD-10-CM

## 2025-08-04 DIAGNOSIS — R10.13 EPIGASTRIC ABDOMINAL PAIN: ICD-10-CM

## 2025-08-04 DIAGNOSIS — Z91.89 AT RISK FOR PAIN DUE TO INTERVENTIONAL PROCEDURE: Primary | ICD-10-CM

## 2025-08-04 DIAGNOSIS — R59.1 LYMPHADENOPATHY: ICD-10-CM

## 2025-08-04 DIAGNOSIS — Z72.0 TOBACCO USE: ICD-10-CM

## 2025-08-04 LAB
ABSOLUTE EOSINOPHIL (OHS): 0.1 K/UL
ABSOLUTE MONOCYTE (OHS): 1.38 K/UL (ref 0.3–1)
ABSOLUTE NEUTROPHIL COUNT (OHS): 7.77 K/UL (ref 1.8–7.7)
ALBUMIN SERPL BCP-MCNC: 4.5 G/DL (ref 3.5–5.2)
ALP SERPL-CCNC: 60 UNIT/L (ref 40–150)
ALT SERPL W/O P-5'-P-CCNC: 8 UNIT/L (ref 10–44)
ANION GAP (OHS): 15 MMOL/L (ref 8–16)
APTT PPP: 24.7 SECONDS (ref 21–32)
AST SERPL-CCNC: 19 UNIT/L (ref 11–45)
BACTERIA BLD CULT: NORMAL
BACTERIA BLD CULT: NORMAL
BASOPHILS # BLD AUTO: 0.08 K/UL
BASOPHILS NFR BLD AUTO: 0.5 %
BILIRUB SERPL-MCNC: 0.6 MG/DL (ref 0.1–1)
BILIRUB UR QL STRIP.AUTO: NEGATIVE
BUN SERPL-MCNC: 14 MG/DL (ref 8–23)
CALCIUM SERPL-MCNC: 8.9 MG/DL (ref 8.7–10.5)
CHLORIDE SERPL-SCNC: 103 MMOL/L (ref 95–110)
CLARITY UR: CLEAR
CO2 SERPL-SCNC: 20 MMOL/L (ref 23–29)
COLOR UR AUTO: YELLOW
CREAT SERPL-MCNC: 1 MG/DL (ref 0.5–1.4)
ERYTHROCYTE [DISTWIDTH] IN BLOOD BY AUTOMATED COUNT: 14.2 % (ref 11.5–14.5)
GFR SERPLBLD CREATININE-BSD FMLA CKD-EPI: >60 ML/MIN/1.73/M2
GLUCOSE SERPL-MCNC: 131 MG/DL (ref 70–110)
GLUCOSE UR QL STRIP: NEGATIVE
HCT VFR BLD AUTO: 41.2 % (ref 40–54)
HGB BLD-MCNC: 14.2 GM/DL (ref 14–18)
HGB UR QL STRIP: NEGATIVE
IMM GRANULOCYTES # BLD AUTO: 0.06 K/UL (ref 0–0.04)
IMM GRANULOCYTES NFR BLD AUTO: 0.4 % (ref 0–0.5)
INR PPP: 1 (ref 0.8–1.2)
KETONES UR QL STRIP: ABNORMAL
LAB FLOW CYTOMETRY ANTIBODIES ANALYZED (OHS): NORMAL
LAB FLOW CYTOMETRY COMMENT (OHS): NORMAL
LAB FLOW CYTOMETRY INTERPRETATION (OHS): NORMAL
LABORATORY COMMENT REPORT: NORMAL
LEUKOCYTE ESTERASE UR QL STRIP: NEGATIVE
LIPASE SERPL-CCNC: 8 U/L (ref 4–60)
LYMPHOCYTES # BLD AUTO: 7.34 K/UL (ref 1–4.8)
MCH RBC QN AUTO: 31.8 PG (ref 27–31)
MCHC RBC AUTO-ENTMCNC: 34.5 G/DL (ref 32–36)
MCV RBC AUTO: 92 FL (ref 82–98)
NITRITE UR QL STRIP: NEGATIVE
NT-PROBNP SERPL-MCNC: 106 PG/ML
NUCLEATED RBC (/100WBC) (OHS): 0 /100 WBC
OB PNL STL: NEGATIVE
PH UR STRIP: 7 [PH]
PLATELET # BLD AUTO: 241 K/UL (ref 150–450)
PMV BLD AUTO: 9.3 FL (ref 9.2–12.9)
POTASSIUM SERPL-SCNC: 4 MMOL/L (ref 3.5–5.1)
PROT SERPL-MCNC: 7 GM/DL (ref 6–8.4)
PROT UR QL STRIP: NEGATIVE
PROTHROMBIN TIME: 11.3 SECONDS (ref 9–12.5)
RBC # BLD AUTO: 4.47 M/UL (ref 4.6–6.2)
RELATIVE EOSINOPHIL (OHS): 0.6 %
RELATIVE LYMPHOCYTE (OHS): 43.9 % (ref 18–48)
RELATIVE MONOCYTE (OHS): 8.2 % (ref 4–15)
RELATIVE NEUTROPHIL (OHS): 46.4 % (ref 38–73)
SODIUM SERPL-SCNC: 138 MMOL/L (ref 136–145)
SP GR UR STRIP: 1.01
TROPONIN I SERPL HS-MCNC: <3 NG/L
UROBILINOGEN UR STRIP-ACNC: NEGATIVE EU/DL
WBC # BLD AUTO: 16.73 K/UL (ref 3.9–12.7)

## 2025-08-04 PROCEDURE — 83880 ASSAY OF NATRIURETIC PEPTIDE: CPT | Mod: ER | Performed by: EMERGENCY MEDICINE

## 2025-08-04 PROCEDURE — 96374 THER/PROPH/DIAG INJ IV PUSH: CPT | Mod: ER

## 2025-08-04 PROCEDURE — 81003 URINALYSIS AUTO W/O SCOPE: CPT | Mod: ER | Performed by: EMERGENCY MEDICINE

## 2025-08-04 PROCEDURE — 82040 ASSAY OF SERUM ALBUMIN: CPT | Mod: ER | Performed by: EMERGENCY MEDICINE

## 2025-08-04 PROCEDURE — 88185 FLOWCYTOMETRY/TC ADD-ON: CPT | Mod: 91 | Performed by: INTERNAL MEDICINE

## 2025-08-04 PROCEDURE — 85730 THROMBOPLASTIN TIME PARTIAL: CPT | Mod: ER | Performed by: EMERGENCY MEDICINE

## 2025-08-04 PROCEDURE — 88341 IMHCHEM/IMCYTCHM EA ADD ANTB: CPT | Mod: TC | Performed by: INTERNAL MEDICINE

## 2025-08-04 PROCEDURE — A4215 STERILE NEEDLE: HCPCS

## 2025-08-04 PROCEDURE — 93005 ELECTROCARDIOGRAM TRACING: CPT | Mod: ER

## 2025-08-04 PROCEDURE — 63600175 PHARM REV CODE 636 W HCPCS: Mod: ER | Performed by: EMERGENCY MEDICINE

## 2025-08-04 PROCEDURE — 93010 ELECTROCARDIOGRAM REPORT: CPT | Mod: ,,, | Performed by: INTERNAL MEDICINE

## 2025-08-04 PROCEDURE — 83690 ASSAY OF LIPASE: CPT | Mod: ER | Performed by: EMERGENCY MEDICINE

## 2025-08-04 PROCEDURE — 85610 PROTHROMBIN TIME: CPT | Mod: ER | Performed by: EMERGENCY MEDICINE

## 2025-08-04 PROCEDURE — 84484 ASSAY OF TROPONIN QUANT: CPT | Mod: ER | Performed by: EMERGENCY MEDICINE

## 2025-08-04 PROCEDURE — 82435 ASSAY OF BLOOD CHLORIDE: CPT | Mod: ER | Performed by: EMERGENCY MEDICINE

## 2025-08-04 PROCEDURE — 63600175 PHARM REV CODE 636 W HCPCS: Performed by: RADIOLOGY

## 2025-08-04 PROCEDURE — 99285 EMERGENCY DEPT VISIT HI MDM: CPT | Mod: 25,ER

## 2025-08-04 PROCEDURE — 25000003 PHARM REV CODE 250: Mod: ER | Performed by: EMERGENCY MEDICINE

## 2025-08-04 PROCEDURE — 25500020 PHARM REV CODE 255: Mod: ER | Performed by: EMERGENCY MEDICINE

## 2025-08-04 PROCEDURE — 85025 COMPLETE CBC W/AUTO DIFF WBC: CPT | Mod: ER | Performed by: EMERGENCY MEDICINE

## 2025-08-04 PROCEDURE — 82272 OCCULT BLD FECES 1-3 TESTS: CPT | Mod: ER | Performed by: EMERGENCY MEDICINE

## 2025-08-04 RX ORDER — ALUMINUM HYDROXIDE, MAGNESIUM HYDROXIDE, AND SIMETHICONE 1200; 120; 1200 MG/30ML; MG/30ML; MG/30ML
30 SUSPENSION ORAL ONCE
Status: COMPLETED | OUTPATIENT
Start: 2025-08-04 | End: 2025-08-04

## 2025-08-04 RX ORDER — PANTOPRAZOLE SODIUM 40 MG/10ML
80 INJECTION, POWDER, LYOPHILIZED, FOR SOLUTION INTRAVENOUS
Status: COMPLETED | OUTPATIENT
Start: 2025-08-04 | End: 2025-08-04

## 2025-08-04 RX ORDER — DICYCLOMINE HYDROCHLORIDE 20 MG/1
20 TABLET ORAL
Status: COMPLETED | OUTPATIENT
Start: 2025-08-04 | End: 2025-08-04

## 2025-08-04 RX ORDER — MORPHINE SULFATE 4 MG/ML
4 INJECTION, SOLUTION INTRAMUSCULAR; INTRAVENOUS
Refills: 0 | Status: DISCONTINUED | OUTPATIENT
Start: 2025-08-04 | End: 2025-08-04

## 2025-08-04 RX ORDER — LIDOCAINE HYDROCHLORIDE 20 MG/ML
15 SOLUTION OROPHARYNGEAL ONCE
Status: COMPLETED | OUTPATIENT
Start: 2025-08-04 | End: 2025-08-04

## 2025-08-04 RX ORDER — MIDAZOLAM HYDROCHLORIDE 1 MG/ML
INJECTION, SOLUTION INTRAMUSCULAR; INTRAVENOUS CODE/TRAUMA/SEDATION MEDICATION
Status: COMPLETED | OUTPATIENT
Start: 2025-08-04 | End: 2025-08-04

## 2025-08-04 RX ORDER — ONDANSETRON 4 MG/1
4 TABLET, ORALLY DISINTEGRATING ORAL EVERY 6 HOURS PRN
Qty: 30 TABLET | Refills: 0 | Status: SHIPPED | OUTPATIENT
Start: 2025-08-04

## 2025-08-04 RX ORDER — LIDOCAINE HYDROCHLORIDE 10 MG/ML
INJECTION, SOLUTION INFILTRATION; PERINEURAL CODE/TRAUMA/SEDATION MEDICATION
Status: COMPLETED | OUTPATIENT
Start: 2025-08-04 | End: 2025-08-04

## 2025-08-04 RX ORDER — IBUPROFEN 200 MG
1 TABLET ORAL DAILY
Qty: 28 PATCH | Refills: 3 | Status: SHIPPED | OUTPATIENT
Start: 2025-08-04

## 2025-08-04 RX ORDER — FENTANYL CITRATE 50 UG/ML
INJECTION, SOLUTION INTRAMUSCULAR; INTRAVENOUS CODE/TRAUMA/SEDATION MEDICATION
Status: COMPLETED | OUTPATIENT
Start: 2025-08-04 | End: 2025-08-04

## 2025-08-04 RX ORDER — PANTOPRAZOLE SODIUM 20 MG/1
20 TABLET, DELAYED RELEASE ORAL DAILY
Qty: 30 TABLET | Refills: 0 | Status: SHIPPED | OUTPATIENT
Start: 2025-08-04 | End: 2026-08-04

## 2025-08-04 RX ORDER — PANTOPRAZOLE SODIUM 40 MG/10ML
40 INJECTION, POWDER, LYOPHILIZED, FOR SOLUTION INTRAVENOUS
Status: DISCONTINUED | OUTPATIENT
Start: 2025-08-04 | End: 2025-08-04

## 2025-08-04 RX ADMIN — MIDAZOLAM HYDROCHLORIDE 1 MG: 1 INJECTION, SOLUTION INTRAMUSCULAR; INTRAVENOUS at 11:08

## 2025-08-04 RX ADMIN — LIDOCAINE HYDROCHLORIDE 15 ML: 20 SOLUTION ORAL at 08:08

## 2025-08-04 RX ADMIN — PANTOPRAZOLE SODIUM 80 MG: 40 INJECTION, POWDER, FOR SOLUTION INTRAVENOUS at 05:08

## 2025-08-04 RX ADMIN — FENTANYL CITRATE 25 MCG: 50 INJECTION, SOLUTION INTRAMUSCULAR; INTRAVENOUS at 11:08

## 2025-08-04 RX ADMIN — DICYCLOMINE HYDROCHLORIDE 20 MG: 20 TABLET ORAL at 08:08

## 2025-08-04 RX ADMIN — IOHEXOL 100 ML: 350 INJECTION, SOLUTION INTRAVENOUS at 06:08

## 2025-08-04 RX ADMIN — MIDAZOLAM HYDROCHLORIDE 0.5 MG: 1 INJECTION, SOLUTION INTRAMUSCULAR; INTRAVENOUS at 11:08

## 2025-08-04 RX ADMIN — ALUMINA, MAGNESIA, AND SIMETHICONE ORAL SUSPENSION REGULAR STRENGTH 30 ML: 1200; 1200; 120 SUSPENSION ORAL at 08:08

## 2025-08-04 RX ADMIN — LIDOCAINE HYDROCHLORIDE 5 ML: 10 INJECTION, SOLUTION INFILTRATION; PERINEURAL at 11:08

## 2025-08-04 RX ADMIN — FENTANYL CITRATE 50 MCG: 50 INJECTION, SOLUTION INTRAMUSCULAR; INTRAVENOUS at 11:08

## 2025-08-04 NOTE — ED PROVIDER NOTES
Encounter Date: 8/4/2025       History     Chief Complaint   Patient presents with    Abdominal Pain     Pt c/o burning abd pain, 2 episodes of bloody stools, weakness after biopsy at 1130 today. Pt took 2 tylenol PTA. Resp e/u, ambulatory with cane.     75 y/o M with PMH of GERD, BPH, HLD, HTN here with c/o black stools. This started 1d ago. Noticing black stool in the toilet, and on the toilet paper. Continues to complain of epigastric burning sensation. Denies any vomiting. Did have a lymph node biopsy today due to retroperitoneal and mesenteric lymphadenopathy.     The history is provided by the patient.     Review of patient's allergies indicates:   Allergen Reactions    Celecoxib Other (See Comments)     Abdominal bloating    Oxycodone hcl-oxycodone-asa      Just does not like it because it is an opiod    Pcn [penicillins]     Tramadol Itching     Past Medical History:   Diagnosis Date    Arthritis     BPH (benign prostatic hyperplasia)     Cataract     GERD (gastroesophageal reflux disease)     Hyperlipidemia     Hypertension     Kidney stones     Thyroid disease      Past Surgical History:   Procedure Laterality Date    BACK SURGERY      CATARACT EXTRACTION      right eye    KNEE SURGERY      left    THYROIDECTOMY, PARTIAL      TONSILLECTOMY       Family History   Problem Relation Name Age of Onset    No Known Problems Mother      No Known Problems Father      Diabetes Paternal Grandmother       Social History[1]  Review of Systems   Constitutional:  Negative for chills and fever.   HENT:  Negative for congestion and dental problem.    Eyes:  Negative for pain and visual disturbance.   Respiratory:  Negative for cough and shortness of breath.    Cardiovascular:  Negative for chest pain and palpitations.   Gastrointestinal:  Positive for abdominal pain and blood in stool. Negative for diarrhea, nausea and vomiting.   Genitourinary:  Negative for dysuria and flank pain.   Musculoskeletal:  Negative for back  pain and neck pain.   Skin:  Negative for rash and wound.   Neurological:  Negative for weakness, numbness and headaches.       Physical Exam     Initial Vitals [08/04/25 1634]   BP Pulse Resp Temp SpO2   (!) 152/83 105 18 97.1 °F (36.2 °C) 98 %      MAP       --         Physical Exam    Constitutional: He appears well-developed and well-nourished. No distress.   HENT:   Head: Normocephalic and atraumatic. Mouth/Throat: Oropharynx is clear and moist.   Eyes: EOM are normal. Pupils are equal, round, and reactive to light.   Neck: Neck supple.   Normal range of motion.  Cardiovascular:  Normal rate and regular rhythm.           Pulmonary/Chest: Breath sounds normal. No respiratory distress.   Abdominal: He exhibits no distension. There is abdominal tenderness.   Epigastric tenderness to palpation.    Genitourinary:    Genitourinary Comments: There is black and dark grey stool in the rectal vault, loose.      Musculoskeletal:         General: No tenderness. Normal range of motion.      Cervical back: Normal range of motion and neck supple.     Neurological: He is alert and oriented to person, place, and time. He has normal strength. No sensory deficit.   Skin: Skin is warm and dry.   Psychiatric: He has a normal mood and affect.         ED Course   Procedures  Labs Reviewed   COMPREHENSIVE METABOLIC PANEL - Abnormal       Result Value    Sodium 138      Potassium 4.0      Chloride 103      CO2 20 (*)     Glucose 131 (*)     BUN 14      Creatinine 1.0      Calcium 8.9      Protein Total 7.0      Albumin 4.5      Bilirubin Total 0.6      ALP 60      AST 19      ALT 8 (*)     Anion Gap 15      eGFR >60     URINALYSIS, REFLEX TO URINE CULTURE - Abnormal    Color, UA Yellow      Appearance, UA Clear      pH, UA 7.0      Spec Grav UA 1.010      Protein, UA Negative      Glucose, UA Negative      Ketones, UA Trace (*)     Bilirubin, UA Negative      Blood, UA Negative      Nitrites, UA Negative      Urobilinogen, UA Negative       Leukocyte Esterase, UA Negative     CBC WITH DIFFERENTIAL - Abnormal    WBC 16.73 (*)     RBC 4.47 (*)     HGB 14.2      HCT 41.2      MCV 92      MCH 31.8 (*)     MCHC 34.5      RDW 14.2      Platelet Count 241      MPV 9.3      Nucleated RBC 0      Neut % 46.4      Lymph % 43.9      Mono % 8.2      Eos % 0.6      Basophil % 0.5      Imm Grans % 0.4      Neut # 7.77 (*)     Lymph # 7.34 (*)     Mono # 1.38 (*)     Eos # 0.10      Baso # 0.08      Imm Grans # 0.06 (*)     Narrative:     This is an appended report.  These results have been appended to a previously verified report.   LIPASE - Normal    Lipase Level 8     PROTIME-INR - Normal    PT 11.3      INR 1.0     APTT - Normal    PTT 24.7     TROPONIN I HIGH SENSITIVITY - Normal    Troponin High Sensitive <3     NT-PRO NATRIURETIC PEPTIDE - Normal    NT-proBNP 106      Narrative:     NOTE:  Access complete set of age - and/or gender-specific reference intervals for this test in the [facility] Collection Manual.   OCCULT BLOOD X 1, STOOL - Normal    OCCULT BLOOD STOOL Negative     CBC W/ AUTO DIFFERENTIAL    Narrative:     The following orders were created for panel order CBC auto differential.  Procedure                               Abnormality         Status                     ---------                               -----------         ------                     CBC with Differential[[id number]]       Abnormal            Final result                 Please view results for these tests on the individual orders.   GREY TOP URINE HOLD    Extra Tube Hold for add-ons.       EKG Readings: (Independently Interpreted)   Rate of 101 beats per minute.  Sinus tachycardia with premature atrial complexes.  Normal axis.  P.r., QRS and QTC intervals within normal limits.  No STEMI.     ECG Results              EKG 12-lead (In process)        Collection Time Result Time QRS Duration [facility] QTC Calculation    08/04/25 17:21:49 08/05/25 09:15:40 88 440                      In process by Interface, Lab In [facility] (08/05/25 09:15:48)                   Narrative:    Test Reason : R10.13,    Vent. Rate : 101 BPM     Atrial Rate : 101 BPM     P-R Int : 160 ms          QRS Dur :  88 ms      QT Int : 340 ms       P-R-T Axes :  62  40  52 degrees    QTcB Int : 440 ms    Sinus tachycardia with Premature atrial complexes  Cannot rule out Anterior infarct ,age undetermined  Abnormal ECG  No previous ECGs available    Referred By: AAAREFERRAL SELF           Confirmed By:                                   Imaging Results              CTA Acute GI Bleed, Abdomen and Pelvis (Final result)  Result time 08/04/25 18:44:44      Final result by Byron Cox DO (08/04/25 18:44:44)                   Impression:      1.  No active areas of contrast extravasation within the bowel or stomach.  2.  Increased number of borderline enlarged retroperitoneal lymph nodes of unclear acuity.  Follow-up CT in 6 months could be considered.  3.  Additional nonacute findings as described above.  4.  Otherwise, no acute findings.    Finalized on: 8/4/2025 6:44 PM By:  Byron Cox  [facility]# [id number]      2025-08-04 18:46:[id number]     [facility]               Narrative:    EXAM: CTA ACUTE GI BLEED, ABDOMEN AND PELVIS    CLINICAL HISTORY: GI bleed    COMPARISON: None    TECHNIQUE: Noncontrast, arterial and portal venous phase imaging with sagittal, and coronal reconstructions.    FINDINGS:  Partially calcified lymph nodes in the right infrahilar region.    There are no aortic aneurysms or dissections.  Celiac artery, SMA, CARMINA, renal arteries and opacified mesenteric branches are patent.  Moderate atherosclerotic calcification.  No active areas of contrast extravasation within the bowel or stomach.    Splenic and hepatic granulomas.  6 mm hypervascular lesion in the posterior segment of the right hepatic lobe likely a flash filling hemangioma.  Liver and spleen are otherwise unremarkable.    5 mm nonobstructing stone at the  lower pole the right kidney.  Right kidney and ureter are otherwise unremarkable.    Left renal cysts.  Left kidney and ureter are otherwise unremarkable.    Gallbladder sludge without cholecystitis.  Common bile duct is unremarkable.    Pancreas and adrenals are unremarkable.    There are increased number of borderline enlarged retroperitoneal lymph nodes measuring up to 1.6 cm.  No mesenteric lymphadenopathy.    No ascites or fat stranding within the abdomen.  No dilated loops of large or small bowel.  Colonic diverticula without diverticulitis.  No evidence for appendicitis.    Pelvis: Prechemotherapy seeds in the prostate.  No pelvic free fluid, iliac chain or inguinal lymphadenopathy.    Anterior and posterior fusion hardware L4-S1.  Multilevel degenerative change lower thoracic and lumbar spine.  No concerning lytic or sclerotic bony lesions.                                         Medications   pantoprazole injection 80 mg (80 mg Intravenous Given 8/4/25 1727)   iohexoL (OMNIPAQUE 350) injection 100 mL (100 mLs Intravenous Given 8/4/25 1820)   aluminum-magnesium hydroxide-simethicone 200-200-20 mg/5 mL suspension 30 mL (30 mLs Oral Given 8/4/25 2047)     And   LIDOcaine viscous HCl 2% oral solution 15 mL (15 mLs Oral Given 8/4/25 2047)   dicyclomine tablet 20 mg (20 mg Oral Given 8/4/25 2046)     Medical Decision Making  Amount and/or Complexity of Data Reviewed  Labs: ordered. Decision-making details documented in ED Course.  Radiology: ordered. Decision-making details documented in ED Course.  ECG/medicine tests: ordered and independent interpretation performed. Decision-making details documented in ED Course.    Risk  OTC drugs.  Prescription drug management.  Parenteral controlled substances.  Risk Details: OTC drugs, prescription drugs and controlled substances considered.  Due to patient's symptoms improving and pain controlled pain medications ordered appropriately.  Differential diagnosis;   "Gastroenteritis, Bowel obstruction, Colitis, Diverticulitis, Cholecystitis, Appendicitis, Perforated bowel, Herniation, Infectious etiology, UTI, Pyelonephritis,  Biliary obstruction, kidney stone among others.           Vitals:    08/04/25 1634 08/04/25 1859 08/04/25 1904 08/04/25 2102   BP: (!) 152/83 (!) 168/105 (!) 161/73 132/66   Pulse: 105 96 93 88   Resp: 18 14 20    Temp: 97.1 °F (36.2 °C)      TempSrc: Axillary      SpO2: 98% 98% 98% 98%   Weight: 76.9 kg (169 lb 10.3 oz)      Height: 6' 1" (1.854 m)       08/04/25 2134   BP: 138/73   Pulse: 76   Resp:    Temp:    TempSrc:    SpO2: 95%   Weight:    Height:        Results for orders placed or performed during the hospital encounter of 08/04/25   EKG 12-lead    Collection Time: 08/04/25  5:21 PM   Result Value Ref Range    QRS Duration 88 ms    [facility]HS QTC Calculation 440 ms   Comprehensive metabolic panel    Collection Time: 08/04/25  5:26 PM   Result Value Ref Range    Sodium 138 136 - 145 mmol/L    Potassium 4.0 3.5 - 5.1 mmol/L    Chloride 103 95 - 110 mmol/L    CO2 20 (L) 23 - 29 mmol/L    Glucose 131 (H) 70 - 110 mg/dL    BUN 14 8 - 23 mg/dL    Creatinine 1.0 0.5 - 1.4 mg/dL    Calcium 8.9 8.7 - 10.5 mg/dL    Protein Total 7.0 6.0 - 8.4 gm/dL    Albumin 4.5 3.5 - 5.2 g/dL    Bilirubin Total 0.6 0.1 - 1.0 mg/dL    ALP 60 40 - 150 unit/L    AST 19 11 - 45 unit/L    ALT 8 (L) 10 - 44 unit/L    Anion Gap 15 8 - 16 mmol/L    eGFR >60 >60 mL/min/1.73/m2   Lipase    Collection Time: 08/04/25  5:26 PM   Result Value Ref Range    Lipase Level 8 4 - 60 U/L   Protime-INR    Collection Time: 08/04/25  5:26 PM   Result Value Ref Range    PT 11.3 9.0 - 12.5 seconds    INR 1.0 0.8 - 1.2   APTT    Collection Time: 08/04/25  5:26 PM   Result Value Ref Range    PTT 24.7 21.0 - 32.0 seconds   Troponin I High Sensitivity    Collection Time: 08/04/25  5:26 PM   Result Value Ref Range    Troponin High Sensitive <3 <=35 ng/L   NT-Pro Natriuretic Peptide    Collection Time: " 08/04/25  5:26 PM   Result Value Ref Range    NT-proBNP 106 <300 pg/mL   CBC with Differential    Collection Time: 08/04/25  5:26 PM   Result Value Ref Range    WBC 16.73 (H) 3.90 - 12.70 K/uL    RBC 4.47 (L) 4.60 - 6.20 M/uL    HGB 14.2 14.0 - 18.0 gm/dL    HCT 41.2 40.0 - 54.0 %    MCV 92 82 - 98 fL    MCH 31.8 (H) 27.0 - 31.0 pg    MCHC 34.5 32.0 - 36.0 g/dL    RDW 14.2 11.5 - 14.5 %    Platelet Count 241 150 - 450 K/uL    MPV 9.3 9.2 - 12.9 fL    Nucleated RBC 0 <=0 /100 WBC    Neut % 46.4 38 - 73 %    Lymph % 43.9 18 - 48 %    Mono % 8.2 4 - 15 %    Eos % 0.6 <=8 %    Basophil % 0.5 <=1.9 %    Imm Grans % 0.4 0.0 - 0.5 %    Neut # 7.77 (H) 1.8 - 7.7 K/uL    Lymph # 7.34 (H) 1 - 4.8 K/uL    Mono # 1.38 (H) 0.3 - 1 K/uL    Eos # 0.10 <=0.5 K/uL    Baso # 0.08 <=0.2 K/uL    Imm Grans # 0.06 (H) 0.00 - 0.04 K/uL   Urinalysis, Reflex to Urine Culture Urine, Clean Catch    Collection Time: 08/04/25  6:59 PM    Specimen: Urine, Clean Catch   Result Value Ref Range    Color, UA Yellow Straw, Elin, Yellow, Light-Orange    Appearance, UA Clear Clear    pH, UA 7.0 5.0 - 8.0    Spec Grav UA 1.010 1.005 - 1.030    Protein, UA Negative Negative    Glucose, UA Negative Negative    Ketones, UA Trace (A) Negative    Bilirubin, UA Negative Negative    Blood, UA Negative Negative    Nitrites, UA Negative Negative    Urobilinogen, UA Negative <2.0 EU/dL    Leukocyte Esterase, UA Negative Negative   GREY TOP URINE HOLD    Collection Time: 08/04/25  6:59 PM   Result Value Ref Range    Extra Tube Hold for add-ons.    Occult blood x 1, stool    Collection Time: 08/04/25  8:53 PM    Specimen: Stool   Result Value Ref Range    OCCULT BLOOD STOOL Negative Negative         Imaging Results              CTA Acute GI Bleed, Abdomen and Pelvis (Final result)  Result time 08/04/25 18:44:44      Final result by Byron Cox DO (08/04/25 18:44:44)                   Impression:      1.  No active areas of contrast extravasation within the  bowel or stomach.  2.  Increased number of borderline enlarged retroperitoneal lymph nodes of unclear acuity.  Follow-up CT in 6 months could be considered.  3.  Additional nonacute findings as described above.  4.  Otherwise, no acute findings.    Finalized on: 8/4/2025 6:44 PM By:  Byron Cox  [facility]# [id number]      2025-08-04 18:46:49.697     [facility]               Narrative:    EXAM: CTA ACUTE GI BLEED, ABDOMEN AND PELVIS    CLINICAL HISTORY: GI bleed    COMPARISON: None    TECHNIQUE: Noncontrast, arterial and portal venous phase imaging with sagittal, and coronal reconstructions.    FINDINGS:  Partially calcified lymph nodes in the right infrahilar region.    There are no aortic aneurysms or dissections.  Celiac artery, SMA, CARMINA, renal arteries and opacified mesenteric branches are patent.  Moderate atherosclerotic calcification.  No active areas of contrast extravasation within the bowel or stomach.    Splenic and hepatic granulomas.  6 mm hypervascular lesion in the posterior segment of the right hepatic lobe likely a flash filling hemangioma.  Liver and spleen are otherwise unremarkable.    5 mm nonobstructing stone at the lower pole the right kidney.  Right kidney and ureter are otherwise unremarkable.    Left renal cysts.  Left kidney and ureter are otherwise unremarkable.    Gallbladder sludge without cholecystitis.  Common bile duct is unremarkable.    Pancreas and adrenals are unremarkable.    There are increased number of borderline enlarged retroperitoneal lymph nodes measuring up to 1.6 cm.  No mesenteric lymphadenopathy.    No ascites or fat stranding within the abdomen.  No dilated loops of large or small bowel.  Colonic diverticula without diverticulitis.  No evidence for appendicitis.    Pelvis: Prechemotherapy seeds in the prostate.  No pelvic free fluid, iliac chain or inguinal lymphadenopathy.    Anterior and posterior fusion hardware L4-S1.  Multilevel degenerative change lower thoracic and  lumbar spine.  No concerning lytic or sclerotic bony lesions.                                        Medications   pantoprazole injection 80 mg (80 mg Intravenous Given 8/4/25 1727)   iohexoL (OMNIPAQUE 350) injection 100 mL (100 mLs Intravenous Given 8/4/25 1820)   aluminum-magnesium hydroxide-simethicone 200-200-20 mg/5 mL suspension 30 mL (30 mLs Oral Given 8/4/25 2047)     And   LIDOcaine viscous HCl 2% oral solution 15 mL (15 mLs Oral Given 8/4/25 2047)   dicyclomine tablet 20 mg (20 mg Oral Given 8/4/25 2046)     6:00 PM - Transfer of Care: Patient care transferred from Dr. Almazan to Dr. Sidhu, pending labs/sudies.      6:25 PM - Re-evaluation:  The patient is resting comfortably and is in no acute distress. Agree c Dr. Almazan's assessment. Discussed test results and notified of pending labs. Answered questions at this time.     8:36 PM Re-evaluation. Dr. Sidhu reassessed the pt. The pt is resting comfortably and is in no acute distress. Discussed available test results and notified pt of pending labs. Answered any questions at this time.     10:25 PM - Re-evaluation: The patient is resting comfortably and is in no acute distress. He states that his symptoms have improved after treatment within ER. Discussed test results, shared treatment plan, specific conditions for return, and importance of follow up with patient and family.  Advised close f/u c pcp/GI within one week and to return to ER if any  issues arise.  He understands and agrees with the plan as discussed. Answered  his questions at this time. He has remained hemodynamically stable throughout the ED course and is appropriate for discharge home.     Regarding ABDOMINAL PAIN, I recommended that the patient: Sip water or other clear fluids; avoid solid food for the first few hours after vomiting or diarrhea; if vomiting, wait 6 hours, and then eat small amounts of mild foods such as rice, applesauce, or crackers; avoid dairy products; avoid  citrus, high-fat foods, fried or greasy foods, tomato products, caffeine, alcohol, and carbonated beverages;  avoid aspirin, ibuprofen or other anti-inflammatory medications, and narcotic pain medications unless prescribed.  In regards to prevention, I encouraged patient to:  Avoid fatty or greasy foods; drink plenty of water each day; eat small meals more frequently; exercise regularly; limit foods that produce gas; make sure meals are well-balanced and high in fiber and include plenty of fruits and vegetables.    Regarding GERD/INDIGESTION, I recommended that the patient: maintain a healthy weight; avoid lying down after meals; avoid eating late at night; elevate the head of the bed by 6 inches; avoid wearing tight-fitting clothes; avoid foods that irritate the stomach (alcohol, fat, chocolate, caffeine, and spearmint or peppermint); talk to primary care provider if taking any medications that can make GERD symptoms worse (calcium channel blockers, theophylline, and anticholinergic medications); begin an exercise program; stop-smoking if applicable; limit alcohol intake to no more than 2 drinks a day; take medications exactly as directed; and avoid over-the-counter nonsteroidal anti-inflammatory drugs, such as aspirin and ibuprofen. Instructed patient to contact primary care provider or return to the emergency department if any of the following signs or symptoms are present: trouble swallowing; pain when swallowing; feeling of food caught in chest or throat; pain in the neck, chest, or back; heartburn that causes emesis; hematemesis; black or tarry stools; increase in salivation; weight loss of more than 3% to 5% of total body weight in a month; hoarseness or sore throat that wont go away; and choking, coughing, or wheezing.    For NAUSEA/VOMITING, I advised patient on importance of staying well hydrated; eating frequent, small amounts of clear liquids; avoid solid foods until there has been no vomiting for six  hours, and then work slowly back to a normal diet; and to use an OTC bismuth stomach remedy for upset stomach, nausea, indigestion, and diarrhea. We discussed signs and symptoms of dehydration and possible causes of N/V with patient (viral infections, medications, migraine headaches, food poisoning, allergies, and peptic ulcer disease).  Reiterated the importance of following up with primary care provider if no improvement is noted within 72 hours.     Pre-hypertension/Hypertension: The pt has been informed that they may have pre-hypertension or hypertension based on a blood pressure reading in the ED. I recommend that the pt call the PCP listed on their discharge instructions or a physician of their choice this week to arrange f/u for further evaluation of possible pre-hypertension or hypertension.     Rico Richards was given a handout which discussed their disease process, precautions, and instructions for follow-up and therapy.     Follow-up Information       Burt Ronquillo MD. Schedule an appointment as soon as possible for a visit in 1 week.    Specialty: Internal Medicine  Contact information:  [address] [facility] [address] [phone]               [facility]. Schedule an appointment as soon as possible for a visit in 1 week.    Specialty: Gastroenterology  Why: and with your physician for biopsy results  Contact information:  [address] [facility] [address] [phone]  Additional information:  Please park on the [address] side and use the Clinic entrance. Check in on the 4th floor, to the left.             [facility] - Emergency Dept.    Specialty: Emergency Medicine  Contact information:  [address]  Emergency Department  [facility] [address] [phone]                              Medication List        START taking these medications      pantoprazole 20 MG tablet  Commonly known as: PROTONIX  Take 1 tablet (20 mg total) by  mouth once daily.            CHANGE how you take these medications      * nicotine 21 mg/24 hr  Commonly known as: NICODERM CQ  Place 1 patch onto the skin once daily.  What changed: Another medication with the same name was added. Make sure you understand how and when to take each.     * nicotine 21 mg/24 hr  Commonly known as: NICODERM CQ  Place 1 patch onto the skin once daily.  What changed: Another medication with the same name was added. Make sure you understand how and when to take each.     * nicotine 21 mg/24 hr  Commonly known as: NICODERM CQ  Place 1 patch onto the skin once daily.  What changed: You were already taking a medication with the same name, and this prescription was added. Make sure you understand how and when to take each.     * ondansetron 4 MG Tbdl  Commonly known as: ZOFRAN-ODT  Take 1 tablet (4 mg total) by mouth every 6 (six) hours as needed.  What changed: Another medication with the same name was added. Make sure you understand how and when to take each.     * ondansetron 4 MG Tbdl  Commonly known as: ZOFRAN-ODT  Take 1 tablet (4 mg total) by mouth every 6 (six) hours as needed.  What changed: You were already taking a medication with the same name, and this prescription was added. Make sure you understand how and when to take each.           * This list has 5 medication(s) that are the same as other medications prescribed for you. Read the directions carefully, and ask your doctor or other care provider to review them with you.                ASK your doctor about these medications      allopurinoL 300 MG tablet  Commonly known as: ZYLOPRIM     aluminum-magnesium hydroxide-simethicone 200-200-20 mg/5 mL Susp  Commonly known as: MAALOX  Take 30 mLs by mouth every 6 (six) hours as needed.     * gabapentin 300 MG capsule  Wait to take this until your doctor or other care provider tells you to start again.  Commonly known as: NEURONTIN  You also have another medication with the same name  that you may need to continue taking.     * gabapentin 600 MG tablet  Commonly known as: NEURONTIN     HYDROcodone-acetaminophen 5-325 mg per tablet  Commonly known as: NORCO     levothyroxine 100 MCG tablet  Commonly known as: SYNTHROID     metFORMIN 500 MG ER 24hr tablet  Commonly known as: GLUCOPHAGE-XR     mirtazapine 15 MG tablet  Commonly known as: REMERON     omeprazole 40 MG capsule  Commonly known as: PRILOSEC     rosuvastatin 40 MG Tab  Commonly known as: CRESTOR     vardenafiL 20 MG tablet  Commonly known as: LEVITRA           * This list has 2 medication(s) that are the same as other medications prescribed for you. Read the directions carefully, and ask your doctor or other care provider to review them with you.                   Where to Get Your Medications        You can get these medications from any pharmacy    Bring a paper prescription for each of these medications  nicotine 21 mg/24 hr  ondansetron 4 MG Tbdl  pantoprazole 20 MG tablet        Current Discharge Medication List            ED Diagnosis  1. At risk for pain due to interventional procedure    2. Epigastric abdominal pain    3. Gastroesophageal reflux disease, unspecified whether esophagitis present    4. Tobacco use                                         Clinical Impression:  Final diagnoses:  [R10.13] Epigastric abdominal pain  [Z91.89] At risk for pain due to interventional procedure (Primary)  [K21.9] Gastroesophageal reflux disease, unspecified whether esophagitis present  [Z72.0] Tobacco use          ED Disposition Condition    Discharge Stable          ED Prescriptions       Medication Sig Dispense Start Date End Date Auth. Provider    pantoprazole (PROTONIX) 20 MG tablet Take 1 tablet (20 mg total) by mouth once daily. 30 tablet 8/4/2025 8/4/2026 Angel Sidhu Jr., MD    ondansetron (ZOFRAN-ODT) 4 MG TbDL Take 1 tablet (4 mg total) by mouth every 6 (six) hours as needed. 30 tablet 8/4/2025 -- Angel Sidhu Jr., MD    nicotine  (NICODERM CQ) 21 mg/24 hr Place 1 patch onto the skin once daily. 28 patch 8/4/2025 -- Angel Sidhu Jr., MD          Follow-up Information       Follow up With Specialties Details Why Contact Info Additional Information    Burt Ronquillo MD Internal Medicine Schedule an appointment as soon as possible for a visit in 1 week  [address] [facility] [address]  [phone]       [facility] Schedule an appointment as soon as possible for a visit in 1 week and with your physician for biopsy results [address] [facility] [address] [phone] Please park on the [address] side and use the Clinic entrance. Check in on the 4th floor, to the left.    [facility] Emergency Dept Emergency Medicine   [address]  Emergency Department  [facility] [id number] [phone]                    [1]   Social History  Tobacco Use    Smoking status: Every Day     Current packs/day: 1.00     Types: Cigarettes    Smokeless tobacco: Never   Substance Use Topics    Alcohol use: No    Drug use: No        Angel Sidhu Jr., MD  08/06/25 0552

## 2025-08-04 NOTE — DISCHARGE SUMMARY
[facility] (Hospital)  Discharge Note  Short Stay    CT Guided Needle Placement      OUTCOME: Patient tolerated treatment/procedure well without complication and is now ready for discharge.    DISPOSITION: Home or Self Care    FINAL DIAGNOSIS:  <principal problem not specified>    FOLLOWUP: In clinic    DISCHARGE INSTRUCTIONS:  No discharge procedures on file.      Clinical Reference Documents Added to Patient Instructions         Document    LYMPH NODE BIOPSY (ENGLISH)    MODERATE SEDATION IN ADULTS DISCHARGE INSTRUCTIONS (ENGLISH)            TIME SPENT ON DISCHARGE: 15 minutes    Pre Op Diagnosis: left retroperitoneal lymph node     Post Op Diagnosis: same     Procedure:  biopsy     Procedure performed by: Annalise ANGULO, Shavon SEN     Written Informed Consent Obtained: Yes     Specimen Removed:  yes     Estimated Blood Loss:  minimal     Findings: Local anesthesia     Sedation:  yes     The patient tolerated the procedure well and there were no complications.      Disposition:  F/U in clinic or with ordering physician    Discharge instructions:  Light activity for 24 hours.  Remove band aid in 24 hours.  No baths (showers are appropriate).      Sterile technique was performed in the left flank, lidocaine was used as a local anesthetic.  Multiple samples taken percutaneously from the lymph node.  Pt tolerated the procedure well without immediate complications.  Please see radiologist report for details. F/u with PCP and/or ordering physician.

## 2025-08-04 NOTE — PROGRESS NOTES
C3 nurse spoke with patient who is unable to complete the call at this time. Patient Requested a Callback. Patient does not have a HOSFU. No message routed at this time.

## 2025-08-04 NOTE — DISCHARGE INSTRUCTIONS
Please return to ER if any of these symptoms occur:  Fever over 101 degrees,  Bleeding from the puncture site not controlled, (Hold pressure for 5 minutes, if bleeding does not stop then go to the ER.)  Pain not controlled with Aleve or Tylenol,    No driving for 24 hours after procedure due to sedation given during procedure.     Do not lift anything heavy, nothing over the size of a gallon of milk, for at least 2 days.    Do not submerge in standing water for 2 days after biopsy but you may shower.    Resume home medications and diet    Biopsy results will be with Dr. Toney in 5-7 days, please follow up with him for results and any other questions or concerns that you may have.

## 2025-08-05 LAB — HOLD SPECIMEN: NORMAL

## 2025-08-05 NOTE — PROGRESS NOTES
C3 nurse contacted Rico Richards for a TCC post hospital discharge follow-up call. The patient politely declined call at this time and denies needing a message sent to his PCP on his behalf about a HOSFU. No messages routed at this time.

## 2025-08-06 LAB
OHS QRS DURATION: 88 MS
OHS QTC CALCULATION: 440 MS

## 2025-08-08 ENCOUNTER — PATIENT MESSAGE (OUTPATIENT)
Dept: HEMATOLOGY/ONCOLOGY | Facility: CLINIC | Age: 75
End: 2025-08-08

## 2025-08-08 ENCOUNTER — OFFICE VISIT (OUTPATIENT)
Dept: HEMATOLOGY/ONCOLOGY | Facility: CLINIC | Age: 75
End: 2025-08-08
Payer: MEDICARE

## 2025-08-08 ENCOUNTER — TELEPHONE (OUTPATIENT)
Dept: HEMATOLOGY/ONCOLOGY | Facility: CLINIC | Age: 75
End: 2025-08-08

## 2025-08-08 VITALS
HEART RATE: 121 BPM | OXYGEN SATURATION: 96 % | BODY MASS INDEX: 22.09 KG/M2 | WEIGHT: 166.69 LBS | DIASTOLIC BLOOD PRESSURE: 86 MMHG | TEMPERATURE: 98 F | SYSTOLIC BLOOD PRESSURE: 142 MMHG | HEIGHT: 73 IN

## 2025-08-08 DIAGNOSIS — C91.10 CLL (CHRONIC LYMPHOCYTIC LEUKEMIA): Primary | ICD-10-CM

## 2025-08-08 DIAGNOSIS — R59.1 LYMPHADENOPATHY: ICD-10-CM

## 2025-08-08 DIAGNOSIS — R10.84 GENERALIZED ABDOMINAL PAIN: ICD-10-CM

## 2025-08-08 DIAGNOSIS — C83.00 SMALL LYMPHOCYTIC LYMPHOMA: ICD-10-CM

## 2025-08-08 DIAGNOSIS — C91.10 CHRONIC LYMPHOCYTIC LEUKEMIA: ICD-10-CM

## 2025-08-08 LAB
DHEA SERPL-MCNC: NORMAL
ESTROGEN SERPL-MCNC: NORMAL PG/ML
INSULIN SERPL-ACNC: NORMAL U[IU]/ML
LAB AP CLINICAL INFORMATION: NORMAL
LAB AP GROSS DESCRIPTION: NORMAL
LAB AP PERFORMING LOCATION(S): NORMAL
LAB AP REPORT FOOTNOTES: NORMAL
LAB FLOW CYTOMETRY ANTIBODIES ANALYZED (OHS): NORMAL
LAB FLOW CYTOMETRY COMMENT (OHS): NORMAL
LAB FLOW CYTOMETRY INTERPRETATION (OHS): NORMAL
LABORATORY COMMENT REPORT: NORMAL
Lab: NORMAL
Lab: NORMAL
T3RU NFR SERPL: NORMAL %

## 2025-08-08 PROCEDURE — 99215 OFFICE O/P EST HI 40 MIN: CPT | Mod: PBBFAC | Performed by: INTERNAL MEDICINE

## 2025-08-08 PROCEDURE — 99999 PR PBB SHADOW E&M-EST. PATIENT-LVL V: CPT | Mod: PBBFAC,,, | Performed by: INTERNAL MEDICINE

## 2025-08-08 RX ORDER — ALPRAZOLAM 0.5 MG/1
0.5 TABLET ORAL 2 TIMES DAILY PRN
Qty: 60 TABLET | Refills: 2 | Status: SHIPPED | OUTPATIENT
Start: 2025-08-08 | End: 2026-08-08

## 2025-08-08 RX ORDER — HYDROCODONE BITARTRATE AND ACETAMINOPHEN 5; 325 MG/1; MG/1
1 TABLET ORAL EVERY 4 HOURS PRN
Qty: 60 TABLET | Refills: 0 | Status: SHIPPED | OUTPATIENT
Start: 2025-08-08

## 2025-08-08 NOTE — PROGRESS NOTES
"Subjective:       Patient ID: Rico Richards is a 74 y.o. male.    Chief Complaint: Results and Lymphoma    HPI:  74-year-old male referred after CT abdomen pelvis demonstrates retroperitoneal lymphadenopathy biopsy results are pending but peripheral blood flow cytometry suggest from small lymphocytic lymphoma patient is here with her brother to discussis treatment in plan therapeutic interventions if any.  Brother is here from [place] ECOG status 2  Past Medical History:   Diagnosis Date    Arthritis     BPH (benign prostatic hyperplasia)     Cataract     GERD (gastroesophageal reflux disease)     Hyperlipidemia     Hypertension     Kidney stones     Thyroid disease      Family History   Problem Relation Name Age of Onset    No Known Problems Mother      No Known Problems Father      Diabetes Paternal Grandmother       Social History[1]  Past Surgical History:   Procedure Laterality Date    BACK SURGERY      CATARACT EXTRACTION      right eye    KNEE SURGERY      left    THYROIDECTOMY, PARTIAL      TONSILLECTOMY         Labs:  Lab Results   Component Value Date    WBC 16.73 (H) 08/04/2025    HGB 14.2 08/04/2025    HCT 41.2 08/04/2025    MCV 92 08/04/2025     08/04/2025     BMP  Lab Results   Component Value Date     08/04/2025    K 4.0 08/04/2025     08/04/2025    CO2 20 (L) 08/04/2025    BUN 14 08/04/2025    CREATININE 1.0 08/04/2025    CALCIUM 8.9 08/04/2025    ANIONGAP 15 08/04/2025    ESTGFRAFRICA 72 05/25/2021    EGFRNONAA >60.0 03/29/2017     Lab Results   Component Value Date    ALT 8 (L) 08/04/2025    AST 19 08/04/2025    ALKPHOS 60 08/04/2025    BILITOT 0.6 08/04/2025       No results found for: "IRON", "TIBC", "FERRITIN", "SATURATEDIRO"  Lab Results   Component Value Date    JVGMBHRP10 404 02/19/2021     No results found for: "FOLATE"  Lab Results   Component Value Date    TSH 0.384 (L) 07/29/2025         Review of Systems   Constitutional:  Positive for fatigue. Negative for activity " change, appetite change, chills, diaphoresis, fever and unexpected weight change.   HENT:  Negative for congestion, dental problem, drooling, ear discharge, ear pain, facial swelling, hearing loss, mouth sores, nosebleeds, postnasal drip, rhinorrhea, sinus pressure, sneezing, sore throat, tinnitus, trouble swallowing and voice change.    Eyes:  Negative for photophobia, pain, discharge, redness, itching and visual disturbance.   Respiratory:  Negative for apnea, cough, choking, chest tightness, shortness of breath, wheezing and stridor.    Cardiovascular:  Negative for chest pain, palpitations and leg swelling.   Gastrointestinal:  Positive for abdominal pain. Negative for abdominal distention, anal bleeding, blood in stool, constipation, diarrhea, nausea, rectal pain and vomiting.   Endocrine: Negative for cold intolerance, heat intolerance, polydipsia, polyphagia and polyuria.   Genitourinary:  Negative for decreased urine volume, difficulty urinating, dysuria, enuresis, flank pain, frequency, genital sores, hematuria, penile discharge, penile pain, penile swelling, scrotal swelling, testicular pain and urgency.   Musculoskeletal:  Positive for back pain. Negative for arthralgias, gait problem, joint swelling, myalgias, neck pain and neck stiffness.   Skin:  Negative for color change, pallor, rash and wound.   Allergic/Immunologic: Negative for environmental allergies, food allergies and immunocompromised state.   Neurological:  Positive for weakness. Negative for dizziness, tremors, seizures, syncope, facial asymmetry, speech difficulty, light-headedness, numbness and headaches.   Hematological:  Negative for adenopathy. Does not bruise/bleed easily.   Psychiatric/Behavioral:  Positive for dysphoric mood. Negative for agitation, behavioral problems, confusion, decreased concentration, hallucinations, self-injury, sleep disturbance and suicidal ideas. The patient is nervous/anxious. The patient is not hyperactive.         Objective:      Physical Exam  Vitals reviewed.   Constitutional:       General: He is not in acute distress.     Appearance: He is well-developed. He is ill-appearing. He is not diaphoretic.   HENT:      Head: Normocephalic.      Right Ear: External ear normal.      Left Ear: External ear normal.      Nose: Nose normal.      Right Sinus: No maxillary sinus tenderness or frontal sinus tenderness.      Left Sinus: No maxillary sinus tenderness or frontal sinus tenderness.      Mouth/Throat:      Pharynx: No oropharyngeal exudate.   Eyes:      General: Lids are normal. No scleral icterus.        Right eye: No discharge.         Left eye: No discharge.      Extraocular Movements:      Right eye: Normal extraocular motion.      Left eye: Normal extraocular motion.      Conjunctiva/sclera:      Right eye: Right conjunctiva is not injected. No hemorrhage.     Left eye: Left conjunctiva is not injected. No hemorrhage.     Pupils: Pupils are equal, round, and reactive to light.   Neck:      Thyroid: No thyromegaly.      Vascular: No JVD.      Trachea: No tracheal deviation.   Cardiovascular:      Rate and Rhythm: Normal rate.   Pulmonary:      Effort: Pulmonary effort is normal. No respiratory distress.      Breath sounds: No stridor.   Abdominal:      General: Bowel sounds are normal.      Palpations: Abdomen is soft. There is no hepatomegaly, splenomegaly or mass.      Tenderness: There is no abdominal tenderness.   Musculoskeletal:         General: No tenderness. Normal range of motion.      Cervical back: Normal range of motion and neck supple.   Lymphadenopathy:      Head:      Right side of head: No posterior auricular or occipital adenopathy.      Left side of head: No posterior auricular or occipital adenopathy.      Cervical: No cervical adenopathy.      Right cervical: No superficial, deep or posterior cervical adenopathy.     Left cervical: No superficial, deep or posterior cervical adenopathy.      Upper  Body:      Right upper body: No supraclavicular adenopathy.      Left upper body: No supraclavicular adenopathy.   Skin:     General: Skin is dry.      Findings: No erythema or rash.      Nails: There is no clubbing.   Neurological:      Mental Status: He is alert and oriented to person, place, and time.      Cranial Nerves: No cranial nerve deficit.      Motor: Weakness present.      Coordination: Coordination abnormal.      Gait: Gait abnormal.   Psychiatric:         Behavior: Behavior normal.         Thought Content: Thought content normal.         Judgment: Judgment normal.             Assessment:      1. CLL (chronic lymphocytic leukemia)    2. Generalized abdominal pain    3. Lymphadenopathy    4. Chronic lymphocytic leukemia    5. Small lymphocytic lymphoma           Med Onc Chart Routing      Follow up with physician . Return for follow-up after biopsy returns in the next 5-7 days virtual visit acceptable nurse navigator will coordinate   Follow up with [facility]    Infusion scheduling note    Injection scheduling note    Labs    Imaging    Pharmacy appointment    Other referrals                 Plan:     Extensive conversation reviewed images personally with he in his brother.  At this time flow cytometry suggestive of chronic lymphocytic leukemia high likelihood this represents small lymphocytic lymphoma variant treatment of this would consist of immunotherapy with drugs such as Rituxan oral agents variant with treatable but not curable malignancy the patient has expressed interest in not having any treatment at all we have talked about hospice philosophy referral was made to [facility].  Brother is present at this time will be happy to see back once I have tissue confirmation but high likelihood because of flow cytometry clinical situation of the above diagnosis.  Answered questions nurse navigation help coordinate follow-up variant patient decides to proceed with treatment will most likely retreat  with single agent Rituxan outpatient setting with a proximally 50% response rate        Shawn Cervantes Jr, MD FACP         [1]   Social History  Socioeconomic History    Marital status: Single   Tobacco Use    Smoking status: Every Day     Current packs/day: 1.00     Types: Cigarettes    Smokeless tobacco: Never   Substance and Sexual Activity    Alcohol use: No    Drug use: No    Sexual activity: Not Currently     Social Drivers of Health     Transportation Needs: No Transportation Needs (8/1/2025)    PRAPARE - Transportation     Lack of Transportation (Medical): No     Lack of Transportation (Non-Medical): No

## 2025-08-08 NOTE — TELEPHONE ENCOUNTER
FATMATA received Hospice referral. FATMATA attempted to call pt to confirm Hospice choice prior to faxing referral. No answer, FATMATA left vm.     Pt called FATMATA back and gave consent to fax referral to [facility], p. [phone] and f. [phone]. FATMATA also called Kylie and informed that fax was coming. SW colleague will f/u on Monday if needed. FATMATA will remain available.

## 2025-08-13 ENCOUNTER — TELEPHONE (OUTPATIENT)
Dept: HEMATOLOGY/ONCOLOGY | Facility: CLINIC | Age: 75
End: 2025-08-13
Payer: MEDICARE

## 2025-08-14 ENCOUNTER — OFFICE VISIT (OUTPATIENT)
Dept: HEMATOLOGY/ONCOLOGY | Facility: CLINIC | Age: 75
End: 2025-08-14
Payer: MEDICARE

## 2025-08-14 DIAGNOSIS — D72.0 GENETIC ANOMALIES OF LEUKOCYTES: ICD-10-CM

## 2025-08-14 DIAGNOSIS — C91.10 CLL (CHRONIC LYMPHOCYTIC LEUKEMIA): Primary | ICD-10-CM
